# Patient Record
Sex: FEMALE | Race: WHITE | Employment: PART TIME | URBAN - METROPOLITAN AREA
[De-identification: names, ages, dates, MRNs, and addresses within clinical notes are randomized per-mention and may not be internally consistent; named-entity substitution may affect disease eponyms.]

---

## 2020-04-01 ENCOUNTER — TELEPHONE (OUTPATIENT)
Dept: DERMATOLOGY | Facility: CLINIC | Age: 30
End: 2020-04-01

## 2020-04-06 NOTE — TELEPHONE ENCOUNTER
Called pt, no answer. Was attempting to reschedule tomorrow appt with Dr. Deleon.     Becky Schreiber LPN

## 2020-05-18 ENCOUNTER — TELEPHONE (OUTPATIENT)
Dept: DERMATOLOGY | Facility: CLINIC | Age: 30
End: 2020-05-18

## 2020-05-18 ENCOUNTER — MYC MEDICAL ADVICE (OUTPATIENT)
Dept: DERMATOLOGY | Facility: CLINIC | Age: 30
End: 2020-05-18

## 2020-05-18 ENCOUNTER — VIRTUAL VISIT (OUTPATIENT)
Dept: DERMATOLOGY | Facility: CLINIC | Age: 30
End: 2020-05-18
Payer: COMMERCIAL

## 2020-05-18 DIAGNOSIS — Z87.898 HISTORY OF ATYPICAL NEVUS: ICD-10-CM

## 2020-05-18 DIAGNOSIS — L70.0 ACNE VULGARIS: Primary | ICD-10-CM

## 2020-05-18 PROCEDURE — 99202 OFFICE O/P NEW SF 15 MIN: CPT | Mod: 95 | Performed by: DERMATOLOGY

## 2020-05-18 RX ORDER — TRETINOIN 0.5 MG/G
CREAM TOPICAL AT BEDTIME
COMMUNITY
End: 2020-05-18

## 2020-05-18 RX ORDER — CLINDAMYCIN PHOSPHATE 10 UG/ML
LOTION TOPICAL 2 TIMES DAILY
Qty: 60 ML | Refills: 11 | Status: SHIPPED | OUTPATIENT
Start: 2020-05-18 | End: 2021-06-08

## 2020-05-18 RX ORDER — SPIRONOLACTONE 100 MG/1
100 TABLET, FILM COATED ORAL DAILY
Qty: 30 TABLET | Refills: 11 | Status: SHIPPED | OUTPATIENT
Start: 2020-05-18 | End: 2020-12-14

## 2020-05-18 RX ORDER — BUPROPION HYDROCHLORIDE 300 MG/1
TABLET ORAL
COMMUNITY
Start: 2020-05-07

## 2020-05-18 RX ORDER — SPIRONOLACTONE 100 MG/1
100 TABLET, FILM COATED ORAL DAILY
COMMUNITY
End: 2020-05-18

## 2020-05-18 RX ORDER — TRETINOIN 0.5 MG/G
CREAM TOPICAL AT BEDTIME
Qty: 45 G | Refills: 11 | Status: SHIPPED | OUTPATIENT
Start: 2020-05-18 | End: 2021-06-08

## 2020-05-18 RX ORDER — CLINDAMYCIN PHOSPHATE 10 UG/ML
LOTION TOPICAL 2 TIMES DAILY
COMMUNITY
End: 2020-05-18

## 2020-05-18 ASSESSMENT — PAIN SCALES - GENERAL: PAINLEVEL: NO PAIN (0)

## 2020-05-18 NOTE — PROGRESS NOTES
[facility]  Store and Forward and Video ( Invitation sent by:  Patricia and send to e-mail at: [email or web address] )    Impression and Recommendations (Patient Counseled on the Following):    1. Acne vulgaris, inflammatory / hormonal acne, well controlled on topical and systemic regimen as below. Recommended additional of BP wash to prevent clindamycin resistance.     - Continue spironolactone 100 mg PO qdaily and OCP. Discussed theoretic risk of teratogenicity and potential side effects. Has had recent normal creatinine / K+.   - Continue tretinoin 0.05% cream at bedtime and clindamycin 1% lotion qAM  - Start over-the-counter benzoyl peroxide 4-5% wash at least twice weekly  - Follow-up in 3 months    2. History of atypical nevi. Follow-up in 3 months for FBSE.     Follow-up:   Follow-up with dermatology in approximately 3 months. Earlier for new or changing lesions or rash.      Staff only:    Chava Saunders MD  Pronouns: he/him/his    Department of Dermatology  [facility]: Phone: [phone], Fax:[phone]  [facility]: Phone: [phone] Fax: [phone]    _____________________________________________________________________________    Dermatology Problem List:  1. Acne vulgaris, mild to moderate inflammatory acne, likely hormonal.  - spironolactone 100 mg PO qdaily  - tretinoin 0.05% cream at bedtime; clindamycin 1% lotion qAM, BP 4-5% wash  2. Hx atypical nevi.     Encounter Date: May 18, 2020    CC:   Chief Complaint   Patient presents with     Acne     face, back, butt and chest- face is more hormonally- using spirnolactone 100mg, clindamycin lotion, Tretinoin for at least a year with improvement        History of Present Illness:  I have reviewed the teledermatology information and the nursing intake corresponding to this issue. Vibha Paulino is a 30  year old female who presents via teledermatology for acne vulgaris.    She recently moved from [place] to [place] and is trying to reestablish care. Reports a many year history of acne on the face, chest, and back that worsens around her menstrual cycle. She has been well controlled on a regimen of spironolactone 100 mg PO qdaily, oral contraceptive, tretinoin 0.05% cream and clindamycin 1% lotion. She reports tolerating the spironolactone well. No vaginal spotting, breast tenderness, dizziness, light-headedness. She is on OCP and has no plans to get pregnant.     Patient also reports a history of atypical nevi and would like to make an appointment for this summer for a FBSE. No personal history of skin cancer. No lesions of concern today.     Health otherwise stable. No other skin concerns.     ROS: Patient is generally feeling well today.     Physical Examination:  General: Well-appearing male, appropriately-developed individual.  Skin: Focused examination within the teledermatology photograph(s) including face, neck, chest, upper extremities was performed.   - Few scattered brown macules and papules on face and chest; no acne lesions appreciated.  - No other lesions of concern on areas examined.     Labs:  Normal creatinine and K+ on 9/25/19    Past Medical History:   There is no problem list on file for this patient.    No past medical history on file.  No past surgical history on file.    Social History:  Patient reports that she has quit smoking. She has never used smokeless tobacco.    Family History:  No family history on file.    Medications:  Current Outpatient Medications   Medication     buPROPion (WELLBUTRIN XL) 300 MG 24 hr tablet     clindamycin (CLEOCIN T) 1 % external lotion     Norgestimate-Eth Estradiol (MONO-LINYAH PO)     spironolactone (ALDACTONE) 100 MG tablet     tretinoin (RETIN-A) 0.05 % external cream     No current facility-administered medications for this visit.        No Known  Allergies  _____________________________________________________________________________    Teledermatology information:  - Location of patient: Home  - Patient presented as: self referral  - Location of teledermatologist:  [facility] )  - Reason teledermatology is appropriate:  of National Emergency Regarding Coronavirus disease (COVID 19) Outbreak  - Image quality and interpretability: acceptable  - Physician has received verbal consent for a Video/Photos Visit from the patient? Yes  - In-person dermatology visit recommendation: no  - Date of images: 5/18/2020  - Service start time: 9:30 AM  - Service end time: 9:45 AM  - Date of report: 5/18/2020

## 2020-05-18 NOTE — PATIENT INSTRUCTIONS
[facility] Teledermatology Visit    Thank you for allowing us to participate in your care. Your findings, instructions and follow-up plan are as follows:    Acne vulgaris    1. Continue clindamycin 1% lotion in the morning, tretinoin 0.05% cream at nighttime.   2. Continue spironolactone 100 mg daily.   3. Start using an over-the-counter benzoyl peroxide 4-5% wash in the shower at least twice weekly.   4. Follow-up in ~3 months for a full body skin examination.       When should I call my doctor?    If you are worsening or not improving, please, contact us or seek urgent care as noted below.     Who should I call with questions (adults)?    [facility] (adult and pediatric): [phone]     [facility] (adult): [phone]    For urgent needs outside of business hours call the [facility] at [phone] and ask for the dermatology resident on call    If this is a medical emergency and you are unable to reach an ER, Call [phone]      Who should I call with questions (pediatric)?  [facility]- Pediatric Dermatology  Dr. Juanita Ramirez, Dr. Renetta Hernandez, Dr. Kaley Osborn, Orin Guidry, JASMIN Rayo, Dr. Danica King & Dr. Mekhi Buckley  Non Urgent  Nurse Triage Line; [phone]- Imelda and Ksenia PICKERING Care Coordinators   Sandra (/Complex ) [phone]    If you need a prescription refill, please contact your pharmacy. Refills are approved or denied by our Physicians during normal business hours, Monday through Fridays  Per office policy, refills will not be granted if you have not been seen within the past year (or sooner depending on your child's condition)    Scheduling Information:  Pediatric Appointment Scheduling and Call Center [phone]  Radiology Scheduling- [phone]  Sedation Unit Scheduling- [phone]  [place] Scheduling- General  [phone]; Pediatric Dermatology [phone]  Main  Services: [phone]  Welsh: [phone]  Afghan: [phone]  Hmong/Divehi/Sinhala: [phone]  Preadmission Nursing Department Fax Number: [phone] (Fax all pre-operative paperwork to this number)    For urgent matters arising during evenings, weekends, or holidays that cannot wait for normal business hours please call [phone] and ask for the Dermatology Resident On-Call to be paged.

## 2020-05-18 NOTE — TELEPHONE ENCOUNTER
Follow-up:   Follow-up with dermatology in approximately 3 months for skin check and acne     Left message for patient to call [facility] in [place] back at [phone] to schedule follow up with Dr. Nicky Neri LPN

## 2020-05-18 NOTE — NURSING NOTE
"Teledermatology Nurse Call for NEW Patients (not seen in last 3 years):     The patient was contacted by phone and we reviewed, \"Due to the coronavirus pandemic, we are calling to reschedule your upcoming visit and offer you a teledermatology visit. This would be assessed by an MD or PAHOLLY;  Importantly, \"a teledermatology visit may not be as thorough as an in-person visit, and the quality of the photograph and/or video sent may not be of the same quality as that taken by the dermatology clinic.\" After discussing the risks, benefits, and alternatives, the patient would like to proceed with teledermatology because of National Emergency Regarding Coronavirus disease (COVID 19) Outbreak.\"    This video visit will be conducted via a call between you and your physician/provider via [employer]. We have found that certain health care needs can be provided without the need for an in-person physical exam.  This service lets us provide the care you need with a video conversation.  If a prescription is necessary we can send it directly to your pharmacy.  If lab work is needed we can place an order for that and you can then stop by our lab to have the test done at a later time.If during the course of the call the physician/provider feels a video visit is not appropriate, you will not be charged for this service.    Patient would like the video invitation sent by: Send to e-mail at: [email or web address]     The patient will also send photographs via [employer] for review.       The patient verified the location of the photo/video to be their home address.      For photos, patient told nursing these will be uploaded prior to visit     The patient was instructed to take photos of all areas of concern and all areas of any rash.       The patient denied skin pain, fever, mucosal symptoms(lesions, blisters, sores in the mouth, nose, eyes, or genitals)  IF PATIENT ENDORSES ANY OF THESE STOP AND PAGE ON CALL ATTENDING    Additional " notes:  Patient summary of issue:acne  Location of problem on body: chest, butt, back and face  How long has area/symptoms been present:at least a year previously seen by Dermatology in [place]  What makes it better?:currently using Spirolactone 100mg, Tretinoin 0.05% cream and clindamycin lotion  What makes it worse?:hormonal worse on face  Other symptoms include the following:none  Which mediations have been tried, for how long, and did they make it better or worse (ex. Triamcinolone, used twice daily for 2 weeks, not improved): Spironolactone, tretinoin and clindamycin  The patient has seen a dermatologist in the past for skin check and acne- in [place].   The patient hasno past medical history of skin cancer. Family history of skin cancer unsure of type  ROS: The patient is generally feeling well.       Nursing tasks completed  -Pharmacy preference was updated.  -The nurse has dropped in the AVS information *(For adults the phrase is umdermhteleavs and for pediatrics it is their own) for the physician to route in the AVS.                                                                                                                                                                                                                         -The patient was told to contact the clinic if they have not received correspondence within 72 hours.     Merna Neri LPN

## 2020-08-31 ENCOUNTER — VIRTUAL VISIT (OUTPATIENT)
Dept: DERMATOLOGY | Facility: CLINIC | Age: 30
End: 2020-08-31
Payer: COMMERCIAL

## 2020-08-31 DIAGNOSIS — L70.0 ACNE VULGARIS: Primary | ICD-10-CM

## 2020-08-31 DIAGNOSIS — Z87.898 HISTORY OF ATYPICAL NEVUS: ICD-10-CM

## 2020-08-31 PROCEDURE — 99441 ZZC PHYSICIAN TELEPHONE EVALUATION 5-10 MIN: CPT | Mod: 95 | Performed by: DERMATOLOGY

## 2020-08-31 NOTE — PATIENT INSTRUCTIONS
[facility] Dermatology Visit    Thank you for allowing us to participate in your care. Your findings, instructions and follow-up plan are as follows:    Acne vulgaris.   1. Continue all therapies as prior prescribed.     History of atypical moles.   1. Will arrange for in-person visit in the next few months.     When should I call my doctor?    If you are worsening or not improving, please, contact us or seek urgent care as noted below.     Who should I call with questions (adults)?    [facility] (adult and pediatric): [phone]     [facility] (adult): [phone]    For urgent needs outside of business hours call the [facility] at [phone] and ask for the dermatology resident on call    If this is a medical emergency and you are unable to reach an ER, Call [phone]      Who should I call with questions (pediatric)?  [facility]- Pediatric Dermatology  Dr. Juanita Ramirez, Dr. Renetta Hernandez, Dr. Kaley Osborn, Orin Guidry, JASMIN Rayo, Dr. Danica King & Dr. Mekhi Buckley  Non Urgent  Nurse Triage Line; [phone]- Imelda and Ksenia RN Care Coordinators   Sandra (/Complex ) [phone]    If you need a prescription refill, please contact your pharmacy. Refills are approved or denied by our Physicians during normal business hours, Monday through Fridays  Per office policy, refills will not be granted if you have not been seen within the past year (or sooner depending on your child's condition)    Scheduling Information:  Pediatric Appointment Scheduling and Call Center [phone]  Radiology Scheduling- [phone]  Sedation Unit Scheduling- [phone]  [place] Scheduling- General [phone]; Pediatric Dermatology [phone]  Main  Services: [phone]  Azeri: [phone]  Dutch: [phone]  Hmong/Ketan/Dave:  [phone]  Preadmission Nursing Department Fax Number: [phone] (Fax all pre-operative paperwork to this number)    For urgent matters arising during evenings, weekends, or holidays that cannot wait for normal business hours please call [phone] and ask for the Dermatology Resident On-Call to be paged.

## 2020-08-31 NOTE — LETTER
"    8/31/2020         RE: Vibha Paulino  [address] [facility] [place] [address]        Dear Colleague,    Thank you for referring your patient, Vibha Paulino, to the [facility]. Please see a copy of my visit note below.    Teledermatology Nurse Call for RETURN patients seen within the last 3 years:      The patient was contacted by phone and we reviewed they have a visit in teledermatology upcoming with an MD or PAEdC;  Importantly, \"a teledermatology visit may not be as thorough as an in-person visit, and the quality of the photograph and/or video sent may not be of the same quality as that taken by the dermatology clinic.\"     We have found that certain health care needs can be provided without the need for an in-person physical exam.   If a prescription is necessary we can send it directly to your pharmacy.  If lab work is needed we can place an order for that and you can then stop by our lab to have the test done at a later time.Visits are billed at different rates depending on your insurance coverage. Please reach out to your insurance provider with any questions.    The patient chose to:                                                                                                                                                                                                                 Consent to a teledermatology visit with [employer] photos. Patient told by nursing these are already uploaded. Instructions sent to patient via [employer] They verified they will be in the state of [place] at the time of the encounter.                                                                                                                                                                                                                                The patient denied skin pain, fever, mucosal symptoms(lesions, blisters, sores in the mouth, nose, eyes, or genitals) no IF PATIENT ENDORSES ANY OF THESE " STOP AND PAGE ON CALL ATTENDING    1. Acne vulgaris, using spironolactone 100 mg PO qdaily and OCP,over-the-counter wash dermatolgica, clindamycin lotion, using retin a once a week     Rajani Tavera LPN    [facility] Dermatology Record:  Store and Forward and Telephone [phone]      Dermatology Problem List:  1. Acne vulgaris, mild to moderate inflammatory acne, likely hormonal.  - spironolactone 100 mg PO qdaily  - tretinoin 0.05% cream at bedtime; clindamycin 1% lotion qAM, BP 4-5% wash  2. Hx atypical nevi.     Encounter Date: Aug 31, 2020    CC:   Chief Complaint   Patient presents with     Derm Problem     Acne       History of Present Illness:  Vibha Paulino is a 30 year old female who presents as follow-up for acne vulgaris. Last seen 3 months ago when she was continued on tretinoin 0.05% cream at bedtime, clindamycin 1% lotion, and spironolactone 100 mg PO qdaily with plan to start benzoyl peroxide 4-5% wash at least once or twice weekly to prevent bacterial resistance. She also had an history of atypical nevi and recommended eventual in-person visit for FBSE.     Today, patient reports that her acne is overall well controlled. She does get occasionally new spots, especially on the back related to her sweating/running. She is using spironolactone 100 mg PO qdaily, clindamycin 1% lotion daily, benzoyl peroxide wash a few times a week in the shower. She uses the tretinoin 0.05% cream only about once a week in the summer as it is too dry and photosensitizing. She reports no significant side effects from spironolactone, including spotting, breast tenderness, headaches, dizziness, changes in urination. Patient is on OCP and has no plans to become pregnant. She would like to schedule an in-person visit for FBSE given her history of atypical nevi. Health otherwise stable. No other skin concerns. .     ROS: Patient is generally feeling well today.     Physical Examination:  General: Well-appearing female,  appropriately-developed individual.  Skin: Focused examination including face and neck was performed.   - No significant acneiform lesions appreciated on exam of head and neck.             Labs:  None.     Past Medical History:   There is no problem list on file for this patient.    No past medical history on file.  No past surgical history on file.    Social History:  Patient reports that she has quit smoking. She has never used smokeless tobacco.    Family History:  No family history on file.    Medications:  Current Outpatient Medications   Medication     buPROPion (WELLBUTRIN XL) 300 MG 24 hr tablet     clindamycin (CLEOCIN T) 1 % external lotion     Norgestimate-Eth Estradiol (MONO-LINYAH PO)     spironolactone (ALDACTONE) 100 MG tablet     tretinoin (RETIN-A) 0.05 % external cream     No current facility-administered medications for this visit.         No Known Allergies    Impression and Recommendations (Patient Counseled on the Following):    1. Acne vulgaris, well controlled on current regimen.   - Continue spironolactone 100 mg PO qdaily  - Continue clindamycin 1% lotion daily and BP 4-5% wash in the shower  - Continue tretinoin 0.05% cream nightly as able    2. History of atypical nevi. Will arrange in-person visit in about 3 months for FBSE.     Follow-up:   Follow-up with dermatology in approximately 3 months. Earlier for new or changing lesions or rash.      Staff only    Chava Saunders MD  Pronouns: he/him/his    Department of Dermatology  [facility] [place]note 2]: Phone: [phone], Fax:[phone]  [facility]: Phone: [phone] Fax: [phone]  _____________________________________________________________________________    Teledermatology information:  - Location of patient: [place]  - Patient presented as: return  - Location of teledermatologist:  [facility]  )  - Reason teledermatology is appropriate:  of National Emergency Regarding Coronavirus disease (COVID 19) Outbreak  - Image quality and interpretability: acceptable  - Physician has received verbal consent for a Video/Photos Visit from the patient? Yes  - In-person dermatology visit recommendation: no  - Date of images: 8/30/2020  - Service start time: 8:15 AM  - Service end time: 8:24 AM  - Date of report: 8/31/2020                                                                                                                                                                                                              Again, thank you for allowing me to participate in the care of your patient.        Sincerely,        Chava Saunders MD

## 2020-08-31 NOTE — PROGRESS NOTES
"Teledermatology Nurse Call for RETURN patients seen within the last 3 years:      The patient was contacted by phone and we reviewed they have a visit in teledermatology upcoming with an MD or GARRETT;  Importantly, \"a teledermatology visit may not be as thorough as an in-person visit, and the quality of the photograph and/or video sent may not be of the same quality as that taken by the dermatology clinic.\"     We have found that certain health care needs can be provided without the need for an in-person physical exam.   If a prescription is necessary we can send it directly to your pharmacy.  If lab work is needed we can place an order for that and you can then stop by our lab to have the test done at a later time.Visits are billed at different rates depending on your insurance coverage. Please reach out to your insurance provider with any questions.    The patient chose to:                                                                                                                                                                                                                 Consent to a teledermatology visit with [employer] photos. Patient told by nursing these are already uploaded. Instructions sent to patient via [employer]. They verified they will be in the state of [place] at the time of the encounter.                                                                                                                                                                                                                                The patient denied skin pain, fever, mucosal symptoms(lesions, blisters, sores in the mouth, nose, eyes, or genitals) no IF PATIENT ENDORSES ANY OF THESE STOP AND PAGE ON CALL ATTENDING    1. Acne vulgaris, using spironolactone 100 mg PO qdaily and OCP,over-the-counter wash dermatolgica, clindamycin lotion, using retin a once a week     Rajani Tavera LPN    [facility] Dermatology Record:  Store and " Forward and Telephone [phone]      Dermatology Problem List:  1. Acne vulgaris, mild to moderate inflammatory acne, likely hormonal.  - spironolactone 100 mg PO qdaily  - tretinoin 0.05% cream at bedtime; clindamycin 1% lotion qAM, BP 4-5% wash  2. Hx atypical nevi.     Encounter Date: Aug 31, 2020    CC:   Chief Complaint   Patient presents with     Derm Problem     Acne       History of Present Illness:  Vibha Paulino is a 30 year old female who presents as follow-up for acne vulgaris. Last seen 3 months ago when she was continued on tretinoin 0.05% cream at bedtime, clindamycin 1% lotion, and spironolactone 100 mg PO qdaily with plan to start benzoyl peroxide 4-5% wash at least once or twice weekly to prevent bacterial resistance. She also had an history of atypical nevi and recommended eventual in-person visit for FBSE.     Today, patient reports that her acne is overall well controlled. She does get occasionally new spots, especially on the back related to her sweating/running. She is using spironolactone 100 mg PO qdaily, clindamycin 1% lotion daily, benzoyl peroxide wash a few times a week in the shower. She uses the tretinoin 0.05% cream only about once a week in the summer as it is too dry and photosensitizing. She reports no significant side effects from spironolactone, including spotting, breast tenderness, headaches, dizziness, changes in urination. Patient is on OCP and has no plans to become pregnant. She would like to schedule an in-person visit for FBSE given her history of atypical nevi. Health otherwise stable. No other skin concerns. .     ROS: Patient is generally feeling well today.     Physical Examination:  General: Well-appearing female, appropriately-developed individual.  Skin: Focused examination including face and neck was performed.   - No significant acneiform lesions appreciated on exam of head and neck.             Labs:  None.     Past Medical History:   There is no problem  list on file for this patient.    No past medical history on file.  No past surgical history on file.    Social History:  Patient reports that she has quit smoking. She has never used smokeless tobacco.    Family History:  No family history on file.    Medications:  Current Outpatient Medications   Medication     buPROPion (WELLBUTRIN XL) 300 MG 24 hr tablet     clindamycin (CLEOCIN T) 1 % external lotion     Norgestimate-Eth Estradiol (MONO-LINYAH PO)     spironolactone (ALDACTONE) 100 MG tablet     tretinoin (RETIN-A) 0.05 % external cream     No current facility-administered medications for this visit.         No Known Allergies    Impression and Recommendations (Patient Counseled on the Following):    1. Acne vulgaris, well controlled on current regimen.   - Continue spironolactone 100 mg PO qdaily  - Continue clindamycin 1% lotion daily and BP 4-5% wash in the shower  - Continue tretinoin 0.05% cream nightly as able    2. History of atypical nevi. Will arrange in-person visit in about 3 months for FBSE.     Follow-up:   Follow-up with dermatology in approximately 3 months. Earlier for new or changing lesions or rash.      Staff only    Chava Saunders MD  Pronouns: he/him/his    Department of Dermatology  [facility]: Phone: [phone], Fax:[phone]  [facility]: Phone: [phone] Fax: [phone]  _____________________________________________________________________________    Teledermatology information:  - Location of patient: [place]  - Patient presented as: return  - Location of teledermatologist:  ([facility] )  - Reason teledermatology is appropriate:  of National Emergency Regarding Coronavirus disease (COVID 19) Outbreak  - Image quality and interpretability: acceptable  - Physician has received verbal consent for a Video/Photos Visit from the patient?  Yes  - In-person dermatology visit recommendation: no  - Date of images: 8/30/2020  - Service start time: 8:15 AM  - Service end time: 8:24 AM  - Date of report: 8/31/2020

## 2020-12-14 ENCOUNTER — OFFICE VISIT (OUTPATIENT)
Dept: DERMATOLOGY | Facility: CLINIC | Age: 30
End: 2020-12-14
Payer: COMMERCIAL

## 2020-12-14 DIAGNOSIS — L81.4 SOLAR LENTIGO: Primary | ICD-10-CM

## 2020-12-14 DIAGNOSIS — D22.9 MULTIPLE BENIGN NEVI: ICD-10-CM

## 2020-12-14 DIAGNOSIS — Z87.898 HISTORY OF ATYPICAL NEVUS: ICD-10-CM

## 2020-12-14 DIAGNOSIS — L70.0 ACNE VULGARIS: ICD-10-CM

## 2020-12-14 PROCEDURE — 99214 OFFICE O/P EST MOD 30 MIN: CPT | Performed by: DERMATOLOGY

## 2020-12-14 RX ORDER — SPIRONOLACTONE 100 MG/1
100 TABLET, FILM COATED ORAL DAILY
Qty: 30 TABLET | Refills: 11 | Status: SHIPPED | OUTPATIENT
Start: 2020-12-14 | End: 2021-12-16

## 2020-12-14 RX ORDER — GABAPENTIN 100 MG/1
CAPSULE ORAL
COMMUNITY
Start: 2020-11-18 | End: 2021-04-13

## 2020-12-14 ASSESSMENT — PAIN SCALES - GENERAL: PAINLEVEL: NO PAIN (0)

## 2020-12-14 NOTE — PROGRESS NOTES
[facility] Dermatology Note    Dermatology Problem List:  1. Acne vulgaris, mild to moderate inflammatory acne, likely hormonal.  - spironolactone 100 mg PO qdaily  - tretinoin 0.05% cream at bedtime; clindamycin 1% lotion qAM, BP 4-5% wash  2. Hx atypical nevi.   -reported on left abdomen and mid back  3. Family history of NMSC  -Brother and mother    Encounter Date: Dec 14, 2020    CC:  Chief Complaint   Patient presents with     Skin Check     FBSE, no personal hx NMSC, mom and brother NMSC, no areas of concern       History of Present Illness:  Ms. Vibha Paulino is a 30 year old female with a history of atypical nevi who presents for a skin check. She was last seen on 8/31/2020 for acne vulgaris, when she reported she was doing well and was continued on her current treatment. Today, she reports her acne is doing pretty well, but she does note a recent flare up during a stressful time. She denies any side effects with spironolactone and is on OCP. She has no areas of concern.    The patient otherwise denies any new or concerning lesions. No bleeding, painful, pruritic, or changing lesions. They report no personal history of skin cancer. There is  family history of skin cancer. No history of immunosuppression.They have been using sunscreen recently, and protective clothing when outdoors for sun protection. No occupational exposure to ultraviolet light or other forms of radiation. Patient is a . Health otherwise stable. No other skin concerns.     Past Medical History:   There is no problem list on file for this patient.    No past medical history on file.  No past surgical history on file.    Social History:  Patient reports that she has quit smoking. She has never used smokeless tobacco.    Family History:  No family history on file.    Medications:  Current Outpatient Medications   Medication Sig Dispense Refill     buPROPion (WELLBUTRIN XL) 300 MG 24 hr tablet TK 1 T PO D UTD        clindamycin (CLEOCIN T) 1 % external lotion Apply topically 2 times daily 60 mL 11     gabapentin (NEURONTIN) 100 MG capsule TK 1 C PO TID UTD       Norgestimate-Eth Estradiol (MONO-LINYAH PO)        spironolactone (ALDACTONE) 100 MG tablet Take 1 tablet (100 mg) by mouth daily 30 tablet 11     tretinoin (RETIN-A) 0.05 % external cream Apply topically At Bedtime 45 g 11       No Known Allergies    Review of Systems:  -As per HPI  -Constitutional: Otherwise feeling well today, in usual state of health.  -Skin: As above in HPI. No additional skin concerns.    Physical exam:  Vitals: There were no vitals taken for this visit.  GEN: This is a well developed, well-nourished female in no acute distress, in a pleasant mood.    SKIN: Total skin excluding the undergarment areas was performed. The exam included the head/face, neck, both arms, chest, back, abdomen, both legs, digits and/or nails.   - Bethea skin type: II  - There is a well healed hypopigmented scar without erythema, nodularity or telangiectasias, or repigmentation on the left upper abdomen and paraspinal back at prior atypical nevi biopsy sites with no repigmentation.   - Few regular brown pigmented macules and papules are identified on the sun exposed areas.   - Scattered brown macules on sun exposed areas.  - No other lesions of concern on areas examined.     Labs:  None.    Impression/Plan:    1. Acne vulgaris. Stable.   - Continue spironolactone 100 mg PO qdaily; 1=year refill provided  - Continue tretinoin 0.05% cream at bedtime; clindamycin 1% qAM, BP 4-5% wash     2. Benign lesions: Multiple benign nevi, solar lentigos. Explained to patient benign nature of lesion. No treatment is necessary at this time unless the lesion changes or becomes symptomatic.   - ABCDs of melanoma were discussed and self skin checks were advised.  - Sun precaution was advised including the use of sun screens of SPF 30 or higher, sun protective clothing, and avoidance  of tanning beds.     3. Hx atypical. NERD.   - ABCDs of melanoma were discussed and self skin checks were advised.  - Sun precaution was advised including the use of sun screens of SPF 30 or higher, sun protective clothing, and avoidance of tanning beds.     Follow-up in 1 year, earlier for new or changing lesions.     Staff Involved:  Scribe/Staff    Scribe Disclosure:   I, Felipe Travis, am serving as a scribe to document services personally performed by this physician, Dr. Chava Saunders, based on data collection and the provider's statements to me.     Provider Disclosure:   The documentation recorded by the scribe accurately reflects the services I personally performed and the decisions made by me.    Chava Saunders MD    Department of Dermatology  [facility]: Phone: [phone], Fax:[phone]  [facility]: Phone: [phone] Fax: [phone]

## 2020-12-14 NOTE — NURSING NOTE
Vibha Paulino's goals for this visit include:   Chief Complaint   Patient presents with     Skin Check     FBSE, no personal hx NMSC, mom and brother NMSC, no areas of concern     She requests these members of her care team be copied on today's visit information: No    PCP: No Ref-Primary, Physician    Referring Provider:  No referring provider defined for this encounter.    There were no vitals taken for this visit.    Do you need any medication refills at today's visit? No    Zoraida Rebollar LPN

## 2020-12-14 NOTE — PATIENT INSTRUCTIONS
Discussed Heliocare / Heliocare Advanced as oral supplement to prevent sunburn when outdoors.

## 2020-12-14 NOTE — LETTER
12/14/2020         RE: Vibha Paulino  [address] [facility] [address]        Dear Colleague,    Thank you for referring your patient, Vibha Paulino, to the [facility]. Please see a copy of my visit note below.    [facility] Dermatology Note    Dermatology Problem List:  1. Acne vulgaris, mild to moderate inflammatory acne, likely hormonal.  - spironolactone 100 mg PO qdaily  - tretinoin 0.05% cream at bedtime; clindamycin 1% lotion qAM, BP 4-5% wash  2. Hx atypical nevi.   -reported on left abdomen and mid back  3. Family history of NMSC  -Brother and mother    Encounter Date: Dec 14, 2020    CC:  Chief Complaint   Patient presents with     Skin Check     FBSE, no personal hx NMSC, mom and brother NMSC, no areas of concern       History of Present Illness:  Ms. Vibha Paulino is a 30 year old female with a history of atypical nevi who presents for a skin check. She was last seen on 8/31/2020 for acne vulgaris, when she reported she was doing well and was continued on her current treatment. Today, she reports her acne is doing pretty well, but she does note a recent flare up during a stressful time. She denies any side effects with spironolactone and is on OCP. She has no areas of concern.    The patient otherwise denies any new or concerning lesions. No bleeding, painful, pruritic, or changing lesions. They report no personal history of skin cancer. There is  family history of skin cancer. No history of immunosuppression.They have been using sunscreen recently, and protective clothing when outdoors for sun protection. No occupational exposure to ultraviolet light or other forms of radiation. Patient is a . Health otherwise stable. No other skin concerns.     Past Medical History:   There is no problem list on file for this patient.    No past medical history on file.  No past surgical history on file.    Social History:  Patient reports  that she has quit smoking. She has never used smokeless tobacco.    Family History:  No family history on file.    Medications:  Current Outpatient Medications   Medication Sig Dispense Refill     buPROPion (WELLBUTRIN XL) 300 MG 24 hr tablet TK 1 T PO D UTD       clindamycin (CLEOCIN T) 1 % external lotion Apply topically 2 times daily 60 mL 11     gabapentin (NEURONTIN) 100 MG capsule TK 1 C PO TID UTD       Norgestimate-Eth Estradiol (MONO-LINYAH PO)        spironolactone (ALDACTONE) 100 MG tablet Take 1 tablet (100 mg) by mouth daily 30 tablet 11     tretinoin (RETIN-A) 0.05 % external cream Apply topically At Bedtime 45 g 11       No Known Allergies    Review of Systems:  -As per HPI  -Constitutional: Otherwise feeling well today, in usual state of health.  -Skin: As above in HPI. No additional skin concerns.    Physical exam:  Vitals: There were no vitals taken for this visit.  GEN: This is a well developed, well-nourished female in no acute distress, in a pleasant mood.    SKIN: Total skin excluding the undergarment areas was performed. The exam included the head/face, neck, both arms, chest, back, abdomen, both legs, digits and/or nails.   - Bethea skin type: II  - There is a well healed hypopigmented scar without erythema, nodularity or telangiectasias, or repigmentation on the left upper abdomen and paraspinal back at prior atypical nevi biopsy sites with no repigmentation.   - Few regular brown pigmented macules and papules are identified on the sun exposed areas.   - Scattered brown macules on sun exposed areas.  - No other lesions of concern on areas examined.     Labs:  None.    Impression/Plan:    1. Acne vulgaris. Stable.   - Continue spironolactone 100 mg PO qdaily; 1=year refill provided  - Continue tretinoin 0.05% cream at bedtime; clindamycin 1% qAM, BP 4-5% wash     2. Benign lesions: Multiple benign nevi, solar lentigos. Explained to patient benign nature of lesion. No treatment is  necessary at this time unless the lesion changes or becomes symptomatic.   - ABCDs of melanoma were discussed and self skin checks were advised.  - Sun precaution was advised including the use of sun screens of SPF 30 or higher, sun protective clothing, and avoidance of tanning beds.     3. Hx atypical. NERD.   - ABCDs of melanoma were discussed and self skin checks were advised.  - Sun precaution was advised including the use of sun screens of SPF 30 or higher, sun protective clothing, and avoidance of tanning beds.     Follow-up in 1 year, earlier for new or changing lesions.     Staff Involved:  Scribe/Staff    Scribe Disclosure:   I, Felipe Travis, am serving as a scribe to document services personally performed by this physician, Dr. Chava Saunders, based on data collection and the provider's statements to me.     Provider Disclosure:   The documentation recorded by the scribe accurately reflects the services I personally performed and the decisions made by me.    Chava Saunders MD    Department of Dermatology  [facility]: Phone: [phone], Fax:[phone]  [facility]: Phone: [phone] Fax: [phone]            Again, thank you for allowing me to participate in the care of your patient.        Sincerely,        Chava Saunders MD

## 2020-12-15 ENCOUNTER — OFFICE VISIT (OUTPATIENT)
Dept: PEDIATRICS | Facility: CLINIC | Age: 30
End: 2020-12-15
Payer: COMMERCIAL

## 2020-12-15 VITALS
WEIGHT: 134.7 LBS | TEMPERATURE: 98.2 F | SYSTOLIC BLOOD PRESSURE: 121 MMHG | HEART RATE: 112 BPM | BODY MASS INDEX: 21.14 KG/M2 | OXYGEN SATURATION: 100 % | DIASTOLIC BLOOD PRESSURE: 92 MMHG | HEIGHT: 67 IN

## 2020-12-15 DIAGNOSIS — Z11.59 ENCOUNTER FOR HEPATITIS C SCREENING TEST FOR LOW RISK PATIENT: ICD-10-CM

## 2020-12-15 DIAGNOSIS — Z13.228 SCREENING FOR ENDOCRINE, NUTRITIONAL, METABOLIC AND IMMUNITY DISORDER: ICD-10-CM

## 2020-12-15 DIAGNOSIS — Z13.0 SCREENING FOR ENDOCRINE, NUTRITIONAL, METABOLIC AND IMMUNITY DISORDER: ICD-10-CM

## 2020-12-15 DIAGNOSIS — Z13.6 CARDIOVASCULAR SCREENING; LDL GOAL LESS THAN 100: ICD-10-CM

## 2020-12-15 DIAGNOSIS — Z12.4 SCREENING FOR CERVICAL CANCER: ICD-10-CM

## 2020-12-15 DIAGNOSIS — N91.2 ABSENCE OF MENSTRUATION: ICD-10-CM

## 2020-12-15 DIAGNOSIS — Z11.4 ENCOUNTER FOR SCREENING FOR HIV: ICD-10-CM

## 2020-12-15 DIAGNOSIS — Z13.21 SCREENING FOR ENDOCRINE, NUTRITIONAL, METABOLIC AND IMMUNITY DISORDER: ICD-10-CM

## 2020-12-15 DIAGNOSIS — Z13.21 ENCOUNTER FOR VITAMIN DEFICIENCY SCREENING: ICD-10-CM

## 2020-12-15 DIAGNOSIS — Z13.29 SCREENING FOR ENDOCRINE, NUTRITIONAL, METABOLIC AND IMMUNITY DISORDER: ICD-10-CM

## 2020-12-15 DIAGNOSIS — Z00.00 ROUTINE GENERAL MEDICAL EXAMINATION AT A HEALTH CARE FACILITY: Primary | ICD-10-CM

## 2020-12-15 PROBLEM — T50.901A POLYSUBSTANCE OVERDOSE: Status: ACTIVE | Noted: 2019-09-12

## 2020-12-15 PROBLEM — Z86.59 HISTORY OF BULIMIA NERVOSA: Status: ACTIVE | Noted: 2020-12-15

## 2020-12-15 PROBLEM — F41.1 GAD (GENERALIZED ANXIETY DISORDER): Status: ACTIVE | Noted: 2020-12-15

## 2020-12-15 PROBLEM — F39 EPISODIC MOOD DISORDER (H): Status: ACTIVE | Noted: 2020-12-15

## 2020-12-15 PROBLEM — F33.9 EPISODE OF RECURRENT MAJOR DEPRESSIVE DISORDER, UNSPECIFIED DEPRESSION EPISODE SEVERITY (H): Status: ACTIVE | Noted: 2020-12-15

## 2020-12-15 PROCEDURE — 99385 PREV VISIT NEW AGE 18-39: CPT | Performed by: FAMILY MEDICINE

## 2020-12-15 PROCEDURE — 87624 HPV HI-RISK TYP POOLED RSLT: CPT | Performed by: FAMILY MEDICINE

## 2020-12-15 PROCEDURE — G0145 SCR C/V CYTO,THINLAYER,RESCR: HCPCS | Performed by: FAMILY MEDICINE

## 2020-12-15 RX ORDER — NORGESTIMATE AND ETHINYL ESTRADIOL 0.25-0.035
1 KIT ORAL DAILY
Qty: 84 TABLET | Refills: 3 | Status: CANCELLED | OUTPATIENT
Start: 2020-12-15

## 2020-12-15 SDOH — HEALTH STABILITY: MENTAL HEALTH: HOW OFTEN DO YOU HAVE A DRINK CONTAINING ALCOHOL?: NEVER

## 2020-12-15 ASSESSMENT — MIFFLIN-ST. JEOR: SCORE: 1358.63

## 2020-12-15 NOTE — NURSING NOTE
"Chief Complaint   Patient presents with     Physical     AFE-discuss birth control       Initial BP (!) 121/92 (BP Location: Right arm, Patient Position: Sitting, Cuff Size: Adult Regular)   Pulse 112   Temp 98.2  F (36.8  C) (Temporal)   Ht 1.702 m (5' 7\")   Wt 61.1 kg (134 lb 11.2 oz)   LMP 11/23/2020   SpO2 100%   Breastfeeding No   BMI 21.10 kg/m   Estimated body mass index is 21.1 kg/m  as calculated from the following:    Height as of this encounter: 1.702 m (5' 7\").    Weight as of this encounter: 61.1 kg (134 lb 11.2 oz).  Medication Reconciliation: complete      JOLANTA Gonzalez      "

## 2020-12-15 NOTE — PATIENT INSTRUCTIONS
Needs fasting labs 12/18/2020  Please place  nexplanon procedure 9:50AM  Preventive Health Recommendations  Female Ages 26 - 39  Yearly exam:   See your health care provider every year in order to    Review health changes.     Discuss preventive care.      Review your medicines if you your doctor has prescribed any.    Until age 30: Get a Pap test every three years (more often if you have had an abnormal result).    After age 30: Talk to your doctor about whether you should have a Pap test every 3 years or have a Pap test with HPV screening every 5 years.   You do not need a Pap test if your uterus was removed (hysterectomy) and you have not had cancer.  You should be tested each year for STDs (sexually transmitted diseases), if you're at risk.   Talk to your provider about how often to have your cholesterol checked.  If you are at risk for diabetes, you should have a diabetes test (fasting glucose).  Shots: Get a flu shot each year. Get a tetanus shot every 10 years.   Nutrition:     Eat at least 5 servings of fruits and vegetables each day.    Eat whole-grain bread, whole-wheat pasta and brown rice instead of white grains and rice.    Get adequate Calcium and Vitamin D.     Lifestyle    Exercise at least 150 minutes a week (30 minutes a day, 5 days of the week). This will help you control your weight and prevent disease.    Limit alcohol to one drink per day.    No smoking.     Wear sunscreen to prevent skin cancer.    See your dentist every six months for an exam and cleaning.

## 2020-12-15 NOTE — PROGRESS NOTES
SUBJECTIVE:   CC: Vibha Paulino is an 30 year old woman who presents for preventive health visit.     Patient has been advised of split billing requirements and indicates understanding: Yes    Healthy Habits:    Do you get at least three servings of calcium containing foods daily (dairy, green leafy vegetables, etc.)? yes    Amount of exercise or daily activities, outside of work: 6-7 day(s) per week    Problems taking medications regularly No    Medication side effects: No    Have you had an eye exam in the past two years? no    Do you see a dentist twice per year? no    Do you have sleep apnea, excessive snoring or daytime drowsiness?no    Discussed contraceptive means available including IUD, nuvaring, combination oral contraceptives, and Nexplanon.   She wishes to proceed with Nexplanon placement for contraception      Today's PHQ-2 Score:   PHQ-2 ( 1999 Pfizer) 12/15/2020 5/18/2020   Q1: Little interest or pleasure in doing things 0 0   Q2: Feeling down, depressed or hopeless 0 0   PHQ-2 Score 0 0       Abuse: Current or Past(Physical, Sexual or Emotional)- No  Do you feel safe in your environment? Yes      Social History     Tobacco Use     Smoking status: Former Smoker     Smokeless tobacco: Never Used   Substance Use Topics     Alcohol use: Never     Frequency: Never     If you drink alcohol do you typically have >3 drinks per day or >7 drinks per week? No                     Reviewed orders with patient.  Reviewed health maintenance and updated orders accordingly - Yes  BP Readings from Last 3 Encounters:   12/15/20 (!) 121/92    Wt Readings from Last 3 Encounters:   12/15/20 61.1 kg (134 lb 11.2 oz)                  Patient Active Problem List   Diagnosis     Episodic mood disorder (H)     Episode of recurrent major depressive disorder, unspecified depression episode severity (H)     RIK (generalized anxiety disorder)     History of bulimia nervosa     Polysubstance overdose     History reviewed. No  pertinent surgical history.    Social History     Tobacco Use     Smoking status: Former Smoker     Smokeless tobacco: Never Used   Substance Use Topics     Alcohol use: Never     Frequency: Never     History reviewed. No pertinent family history.      Current Outpatient Medications   Medication Sig Dispense Refill     buPROPion (WELLBUTRIN XL) 300 MG 24 hr tablet TK 1 T PO D UTD       clindamycin (CLEOCIN T) 1 % external lotion Apply topically 2 times daily 60 mL 11     gabapentin (NEURONTIN) 100 MG capsule TK 1 C PO TID UTD       Norgestimate-Eth Estradiol (MONO-LINYAH PO)        spironolactone (ALDACTONE) 100 MG tablet Take 1 tablet (100 mg) by mouth daily 30 tablet 11     tretinoin (RETIN-A) 0.05 % external cream Apply topically At Bedtime 45 g 11     No Known Allergies  No lab results found.     Mammogram not appropriate for this patient based on age.    Pertinent mammograms are reviewed under the imaging tab.  History of abnormal Pap smear: NO - age 21-29 PAP every 3 years recommended  NO - age 30- 65 PAP every 3 years recommended  NO - age 30-65 PAP every 5 years with negative HPV co-testing recommended     Reviewed and updated as needed this visit by clinical staff  Tobacco  Allergies  Meds   Med Hx  Surg Hx  Fam Hx  Soc Hx        Reviewed and updated as needed this visit by Provider      Med Hx  Surg Hx  Fam Hx         Past Medical History:   Diagnosis Date     Acne vulgaris       History reviewed. No pertinent surgical history.  OB History   No obstetric history on file.       ROS:  CONSTITUTIONAL: NEGATIVE for fever, chills, change in weight  INTEGUMENTARU/SKIN: NEGATIVE for worrisome rashes, moles or lesions  EYES: NEGATIVE for vision changes or irritation  ENT: NEGATIVE for ear, mouth and throat problems  RESP: NEGATIVE for significant cough or SOB  BREAST: NEGATIVE for masses, tenderness or discharge  CV: NEGATIVE for chest pain, palpitations or peripheral edema  GI: NEGATIVE for nausea,  "abdominal pain, heartburn, or change in bowel habits  : NEGATIVE for unusual urinary or vaginal symptoms. Periods are regular.  MUSCULOSKELETAL: NEGATIVE for significant arthralgias or myalgia  NEURO: NEGATIVE for weakness, dizziness or paresthesias  PSYCHIATRIC: NEGATIVE for changes in mood or affect    OBJECTIVE:   BP (!) 121/92 (BP Location: Right arm, Patient Position: Sitting, Cuff Size: Adult Regular)   Pulse 112   Temp 98.2  F (36.8  C) (Temporal)   Ht 1.702 m (5' 7\")   Wt 61.1 kg (134 lb 11.2 oz)   LMP 11/23/2020   SpO2 100%   Breastfeeding No   BMI 21.10 kg/m    EXAM:  GENERAL: healthy, alert and no distress  EYES: Eyes grossly normal to inspection, PERRL and conjunctivae and sclerae normal  HENT: ear canals and TM's normal, nose and mouth without ulcers or lesions  NECK: no adenopathy, no asymmetry, masses, or scars and thyroid normal to palpation  RESP: lungs clear to auscultation - no rales, rhonchi or wheezes  BREAST: normal without masses, tenderness or nipple discharge and no palpable axillary masses or adenopathy  CV: regular rate and rhythm, normal S1 S2, no S3 or S4, no murmur, click or rub, no peripheral edema and peripheral pulses strong  ABDOMEN: soft, nontender, no hepatosplenomegaly, no masses and bowel sounds normal   (female): normal female external genitalia, normal urethral meatus, vaginal mucosa pink, moist, well rugated, and normal cervix/adnexa/uterus without masses or discharge  MS: no gross musculoskeletal defects noted, no edema  SKIN: no suspicious lesions or rashes  NEURO: Normal strength and tone, mentation intact and speech normal  PSYCH: mentation appears normal, affect normal/bright      ASSESSMENT/PLAN:   1. Routine general medical examination at a health care facility  See counseling    2. Screening for cervical cancer  - Pap imaged thin layer screen with HPV - recommended age 30 - 65 years (select HPV order below)  - HPV High Risk Types DNA Cervical    3. " "Screening for endocrine, nutritional, metabolic and immunity disorder  - Comprehensive metabolic panel; Future  - TSH with free T4 reflex; Future    - JUST IN CASE; Future    4. CARDIOVASCULAR SCREENING; LDL GOAL LESS THAN 100  - Lipid panel reflex to direct LDL Fasting; Future    5. Encounter for screening for HIV  - HIV Antigen Antibody Combo; Future    6. Encounter for vitamin deficiency screening  - Vitamin D Deficiency; Future    7. Encounter for hepatitis C screening test for low risk patient  - **Hepatitis C Screen Reflex to RNA FUTURE anytime; Future    8. Absence of menstruation  - HCG Qual, Urine ([id number]); Future      COUNSELING:   Reviewed preventive health counseling, as reflected in patient instructions       Regular exercise       Healthy diet/nutrition       Vision screening       Hearing screening       Contraception       Family planning       Colon cancer screening       Consider Hep C screening for all patients one time for ages 18-79 years       HIV screeninx in teen years, 1x in adult years, and at intervals if high risk    Estimated body mass index is 21.1 kg/m  as calculated from the following:    Height as of this encounter: 1.702 m (5' 7\").    Weight as of this encounter: 61.1 kg (134 lb 11.2 oz).        She reports that she has quit smoking. She has never used smokeless tobacco.      Counseling Resources:  ATP IV Guidelines  Pooled Cohorts Equation Calculator  Breast Cancer Risk Calculator  BRCA-Related Cancer Risk Assessment: FHS-7 Tool  FRAX Risk Assessment  ICSI Preventive Guidelines  Dietary Guidelines for Americans,   USDA's MyPlate  ASA Prophylaxis  Lung CA Screening    Vlad Broderick MD  [facility]  "

## 2020-12-17 LAB
COPATH REPORT: NORMAL
PAP: NORMAL

## 2020-12-18 DIAGNOSIS — Z11.4 ENCOUNTER FOR SCREENING FOR HIV: ICD-10-CM

## 2020-12-18 DIAGNOSIS — Z13.228 SCREENING FOR ENDOCRINE, NUTRITIONAL, METABOLIC AND IMMUNITY DISORDER: ICD-10-CM

## 2020-12-18 DIAGNOSIS — Z13.21 SCREENING FOR ENDOCRINE, NUTRITIONAL, METABOLIC AND IMMUNITY DISORDER: ICD-10-CM

## 2020-12-18 DIAGNOSIS — N91.2 ABSENCE OF MENSTRUATION: ICD-10-CM

## 2020-12-18 DIAGNOSIS — Z13.6 CARDIOVASCULAR SCREENING; LDL GOAL LESS THAN 100: ICD-10-CM

## 2020-12-18 DIAGNOSIS — Z13.0 SCREENING FOR ENDOCRINE, NUTRITIONAL, METABOLIC AND IMMUNITY DISORDER: ICD-10-CM

## 2020-12-18 DIAGNOSIS — Z13.29 SCREENING FOR ENDOCRINE, NUTRITIONAL, METABOLIC AND IMMUNITY DISORDER: ICD-10-CM

## 2020-12-18 DIAGNOSIS — Z11.59 ENCOUNTER FOR HEPATITIS C SCREENING TEST FOR LOW RISK PATIENT: ICD-10-CM

## 2020-12-18 LAB
ALBUMIN SERPL-MCNC: 3.7 G/DL (ref 3.4–5)
ALP SERPL-CCNC: 80 U/L (ref 40–150)
ALT SERPL W P-5'-P-CCNC: 24 U/L (ref 0–50)
ANION GAP SERPL CALCULATED.3IONS-SCNC: 2 MMOL/L (ref 3–14)
AST SERPL W P-5'-P-CCNC: 27 U/L (ref 0–45)
BILIRUB SERPL-MCNC: 0.3 MG/DL (ref 0.2–1.3)
BUN SERPL-MCNC: 7 MG/DL (ref 7–30)
CALCIUM SERPL-MCNC: 8.7 MG/DL (ref 8.5–10.1)
CHLORIDE SERPL-SCNC: 108 MMOL/L (ref 94–109)
CHOLEST SERPL-MCNC: 174 MG/DL
CO2 SERPL-SCNC: 30 MMOL/L (ref 20–32)
CREAT SERPL-MCNC: 0.71 MG/DL (ref 0.52–1.04)
FINAL DIAGNOSIS: NORMAL
GFR SERPL CREATININE-BSD FRML MDRD: >90 ML/MIN/{1.73_M2}
GLUCOSE SERPL-MCNC: 96 MG/DL (ref 70–99)
HDLC SERPL-MCNC: 68 MG/DL
HPV HR 12 DNA CVX QL NAA+PROBE: NEGATIVE
HPV16 DNA SPEC QL NAA+PROBE: NEGATIVE
HPV18 DNA SPEC QL NAA+PROBE: NEGATIVE
LDLC SERPL CALC-MCNC: 97 MG/DL
NONHDLC SERPL-MCNC: 106 MG/DL
POTASSIUM SERPL-SCNC: 4.3 MMOL/L (ref 3.4–5.3)
PROT SERPL-MCNC: 7 G/DL (ref 6.8–8.8)
SODIUM SERPL-SCNC: 140 MMOL/L (ref 133–144)
SPECIMEN DESCRIPTION: NORMAL
SPECIMEN SOURCE CVX/VAG CYTO: NORMAL
TRIGL SERPL-MCNC: 43 MG/DL
TSH SERPL DL<=0.005 MIU/L-ACNC: 1.16 MU/L (ref 0.4–4)

## 2020-12-18 PROCEDURE — 80053 COMPREHEN METABOLIC PANEL: CPT | Performed by: FAMILY MEDICINE

## 2020-12-18 PROCEDURE — 87389 HIV-1 AG W/HIV-1&-2 AB AG IA: CPT | Performed by: FAMILY MEDICINE

## 2020-12-18 PROCEDURE — 36415 COLL VENOUS BLD VENIPUNCTURE: CPT | Performed by: FAMILY MEDICINE

## 2020-12-18 PROCEDURE — 86803 HEPATITIS C AB TEST: CPT | Performed by: FAMILY MEDICINE

## 2020-12-18 PROCEDURE — 80061 LIPID PANEL: CPT | Performed by: FAMILY MEDICINE

## 2020-12-18 PROCEDURE — 84443 ASSAY THYROID STIM HORMONE: CPT | Performed by: FAMILY MEDICINE

## 2020-12-18 PROCEDURE — 82306 VITAMIN D 25 HYDROXY: CPT | Performed by: FAMILY MEDICINE

## 2020-12-19 LAB
DEPRECATED CALCIDIOL+CALCIFEROL SERPL-MC: 52 UG/L (ref 20–75)
HIV 1+2 AB+HIV1 P24 AG SERPL QL IA: NONREACTIVE

## 2020-12-20 LAB — HCV AB SERPL QL IA: NONREACTIVE

## 2021-01-04 ENCOUNTER — HEALTH MAINTENANCE LETTER (OUTPATIENT)
Age: 31
End: 2021-01-04

## 2021-04-13 ENCOUNTER — VIRTUAL VISIT (OUTPATIENT)
Dept: FAMILY MEDICINE | Facility: CLINIC | Age: 31
End: 2021-04-13
Payer: COMMERCIAL

## 2021-04-13 DIAGNOSIS — Z71.89 ADVANCED DIRECTIVES, COUNSELING/DISCUSSION: ICD-10-CM

## 2021-04-13 DIAGNOSIS — R30.0 DYSURIA: ICD-10-CM

## 2021-04-13 DIAGNOSIS — N89.8 VAGINAL DISCHARGE: Primary | ICD-10-CM

## 2021-04-13 LAB
ALBUMIN UR-MCNC: NEGATIVE MG/DL
APPEARANCE UR: CLEAR
BILIRUB UR QL STRIP: NEGATIVE
COLOR UR AUTO: YELLOW
GLUCOSE UR STRIP-MCNC: NEGATIVE MG/DL
HGB UR QL STRIP: NEGATIVE
KETONES UR STRIP-MCNC: NEGATIVE MG/DL
LEUKOCYTE ESTERASE UR QL STRIP: NEGATIVE
NITRATE UR QL: NEGATIVE
PH UR STRIP: 5.5 PH (ref 5–7)
SOURCE: NORMAL
SP GR UR STRIP: 1.01 (ref 1–1.03)
UROBILINOGEN UR STRIP-ACNC: 0.2 EU/DL (ref 0.2–1)

## 2021-04-13 PROCEDURE — 81003 URINALYSIS AUTO W/O SCOPE: CPT | Performed by: NURSE PRACTITIONER

## 2021-04-13 PROCEDURE — 99213 OFFICE O/P EST LOW 20 MIN: CPT | Mod: 95 | Performed by: NURSE PRACTITIONER

## 2021-04-13 RX ORDER — FLUCONAZOLE 150 MG/1
150 TABLET ORAL ONCE
Qty: 2 TABLET | Refills: 0 | Status: SHIPPED | OUTPATIENT
Start: 2021-04-13 | End: 2021-04-13

## 2021-04-13 ASSESSMENT — ANXIETY QUESTIONNAIRES
1. FEELING NERVOUS, ANXIOUS, OR ON EDGE: SEVERAL DAYS
6. BECOMING EASILY ANNOYED OR IRRITABLE: SEVERAL DAYS
7. FEELING AFRAID AS IF SOMETHING AWFUL MIGHT HAPPEN: NOT AT ALL
IF YOU CHECKED OFF ANY PROBLEMS ON THIS QUESTIONNAIRE, HOW DIFFICULT HAVE THESE PROBLEMS MADE IT FOR YOU TO DO YOUR WORK, TAKE CARE OF THINGS AT HOME, OR GET ALONG WITH OTHER PEOPLE: NOT DIFFICULT AT ALL
5. BEING SO RESTLESS THAT IT IS HARD TO SIT STILL: SEVERAL DAYS
2. NOT BEING ABLE TO STOP OR CONTROL WORRYING: NOT AT ALL
3. WORRYING TOO MUCH ABOUT DIFFERENT THINGS: NOT AT ALL
GAD7 TOTAL SCORE: 4

## 2021-04-13 ASSESSMENT — PATIENT HEALTH QUESTIONNAIRE - PHQ9
5. POOR APPETITE OR OVEREATING: SEVERAL DAYS
SUM OF ALL RESPONSES TO PHQ QUESTIONS 1-9: 4

## 2021-04-13 NOTE — PROGRESS NOTES
Vibha is a 31 year old who is being evaluated via a billable video visit.      How would you like to obtain your AVS? MyChart  If the video visit is dropped, the invitation should be resent by: Send to e-mail at: [email or web address]  Will anyone else be joining your video visit? No      Video Start Time: 11:53 AM    Assessment & Plan     1. Vaginal discharge    2. Dysuria    3. Advanced directives, counseling/discussion       Normal UA.   treating for yeast. Discussed rationale. Pt. Stated hygiene practices are likely cause. Discussed routine skin cares, rationale, and dry skin prevention. Return to clinic with any new or worsening symptoms, and as needed.     F/u with PCP for Nexplanon and STD screen.                  Return in about 1 week (around 4/20/2021) for re-check office visit.    EV Sidhu CNP  [facility] [place] is a 31 year old who presents for the following health issues  accompanied by her self:    HPI     Genitourinary - Female  Onset/Duration: 1 week - PATIENT LEFT URINE ALREADY  Description:   Painful urination (Dysuria): YES           Frequency: YES  Blood in urine (Hematuria): no  Delay in urine (Hesitency): YES  Intensity: moderate  Progression of Symptoms:  same  Accompanying Signs & Symptoms:  Fever/chills: no  Flank pain: no  Nausea and vomiting: no  Vaginal symptoms: discharge  Abdominal/Pelvic Pain: YES  History:   History of frequent UTI s: no  History of kidney stones: no  Sexually Active: not currenly  Possibility of pregnancy: No  Precipitating or alleviating factors: None    Therapies tried and outcome: Cranberry juice prn (contraindicated in Coumadin patients) and Increase fluid intake    Vaginal discharge, no lesions.   In monogamous relationship. Not using condoms.   runs      Review of Systems   Constitutional, HEENT, cardiovascular, pulmonary, gi and gu systems are negative, except as otherwise noted.      Objective    Vitals -  Patient Reported  Weight (Patient Reported): 59.9 kg (132 lb)  Pain Score: No Pain (0)      Vitals:  No vitals were obtained today due to virtual visit.    Physical Exam   GENERAL: Healthy, alert and no distress  EYES: Eyes grossly normal to inspection.  No discharge or erythema, or obvious scleral/conjunctival abnormalities.  RESP: No audible wheeze, cough, or visible cyanosis.  No visible retractions or increased work of breathing.    SKIN: Visible skin clear. No significant rash, abnormal pigmentation or lesions.  NEURO: Cranial nerves grossly intact.  Mentation and speech appropriate for age.  PSYCH: Mentation appears normal, affect normal/bright, judgement and insight intact, normal speech and appearance well-groomed.    UA- normal.             Video-Visit Details    Type of service:  Video Visit    Video End Time:12:09 PM    Originating Location (pt. Location): Home    Distant Location (provider location):  [facility] [place]     Platform used for Video Visit: [employer]

## 2021-04-13 NOTE — LETTER
My Depression Action Plan  Name: Vibha Paulino   Date of Birth 1990  Date: 4/13/2021    My doctor: No Ref-Primary, Physician   My clinic: [facility] [place]  [address], [address]  [place] [id number] [phone]          GREEN    ZONE   Good Control    What it looks like:     Things are going generally well. You have normal ups and downs. You may even feel depressed from time to time, but bad moods usually last less than a day.   What you need to do:  1. Continue to care for yourself (see self care plan)  2. Check your depression survival kit and update it as needed  3. Follow your physician s recommendations including any medication.  4. Do not stop taking medication unless you consult with your physician first.           YELLOW         ZONE Getting Worse    What it looks like:     Depression is starting to interfere with your life.     It may be hard to get out of bed; you may be starting to isolate yourself from others.    Symptoms of depression are starting to last most all day and this has happened for several days.     You may have suicidal thoughts but they are not constant.   What you need to do:     1. Call your care team. Your response to treatment will improve if you keep your care team informed of your progress. Yellow periods are signs an adjustment may need to be made.     2. Continue your self-care.  Just get dressed and ready for the day.  Don't give yourself time to talk yourself out of it.    3. Talk to someone in your support network.    4. Open up your Depression Self-Care Plan/Wellness Kit.           RED    ZONE Medical Alert - Get Help    What it looks like:     Depression is seriously interfering with your life.     You may experience these or other symptoms: You can t get out of bed most days, can t work or engage in other necessary activities, you have trouble taking care of basic hygiene, or basic responsibilities, thoughts of suicide or death  that will not go away, self-injurious behavior.     What you need to do:  1. Call your care team and request a same-day appointment. If they are not available (weekends or after hours) call your local crisis line, emergency room or 911.          Depression Self-Care Plan / Wellness Kit    Many people find that medication and therapy are helpful treatments for managing depression. In addition, making small changes to your everyday life can help to boost your mood and improve your wellbeing. Below are some tips for you to consider. Be sure to talk with your medical provider and/or behavioral health consultant if your symptoms are worsening or not improving.     Sleep   Sleep hygiene  means all of the habits that support good, restful sleep. It includes maintaining a consistent bedtime and wake time, using your bedroom only for sleeping or sex, and keeping the bedroom dark and free of distractions like a computer, smartphone, or television.     Develop a Healthy Routine  Maintain good hygiene. Get out of bed in the morning, make your bed, brush your teeth, take a shower, and get dressed. Don t spend too much time viewing media that makes you feel stressed. Find time to relax each day.    Exercise  Get some form of exercise every day. This will help reduce pain and release endorphins, the  feel good  chemicals in your brain. It can be as simple as just going for a walk or doing some gardening, anything that will get you moving.      Diet  Strive to eat healthy foods, including fruits and vegetables. Drink plenty of water. Avoid excessive sugar, caffeine, alcohol, and other mood-altering substances.     Stay Connected with Others  Stay in touch with friends and family members.    Manage Your Mood  Try deep breathing, massage therapy, biofeedback, or meditation. Take part in fun activities when you can. Try to find something to smile about each day.     Psychotherapy  Be open to working with a therapist if your provider  recommends it.     Medication  Be sure to take your medication as prescribed. Most anti-depressants need to be taken every day. It usually takes several weeks for medications to work. Not all medicines work for all people. It is important to follow-up with your provider to make sure you have a treatment plan that is working for you. Do not stop your medication abruptly without first discussing it with your provider.    Crisis Resources   These hotlines are for both adults and children. They and are open 24 hours a day, 7 days a week unless noted otherwise.      National Suicide Prevention Lifeline   0-185-074-NIJY (1774)      Crisis Text Line    www.crisistextline.org  Text HOME to 082962 from anywhere in the [place], anytime, about any type of crisis. A live, trained crisis counselor will receive the text and respond quickly.      [place] Lifeline for LGBTQ Youth  A national crisis intervention and suicide lifeline for LGBTQ youth under 25. Provides a safe place to talk without judgement. Call 1-291.522.1354; text START to 917134 or visit www.thetrevorproject.org to talk to a trained counselor.      For Critical access hospital crisis numbers, visit the [facility] website at:  https://[place].gov/dhs/people-we-serve/adults/health-care/mental-health/resources/crisis-contacts.jsp

## 2021-04-14 ASSESSMENT — ANXIETY QUESTIONNAIRES: GAD7 TOTAL SCORE: 4

## 2021-04-16 NOTE — PROGRESS NOTES
"Nexplanon Insertion:    Is a pregnancy test required: No.  Was a consent obtained?  Yes    Subjective: Vibha Paulino is a 31 year old No obstetric history on file. presents for Nexplanon.    Patient has been given the opportunity to ask questions about all forms of birth control, including all options appropriate for Vibha Paulino. Discussed that no method of birth control, except abstinence is 100% effective against pregnancy or sexually transmitted infection.     Vibha Paulino understands she may have the Nexplanon removed at any time and it should be removed by a health care provider.    The entire insertion procedure was reviewed with the patient, including care after placement.     Patient's last menstrual period was 03/22/2021 (exact date). Has current contraception. 03/22/2021. No allergy to betadine or shellfish. Patient desires STD screening  HCG Qual Urine   Date Value Ref Range Status   04/20/2021 Negative NEG^Negative Final     Comment:     This test is for screening purposes.  Results should be interpreted along with   the clinical picture.  Confirmation testing is available if warranted by   ordering [id number], HCG Quantitative Pregnancy.           /84 (BP Location: Left arm, Patient Position: Chair, Cuff Size: Adult Regular)   Pulse 92   Temp 98.9  F (37.2  C) (Temporal)   Resp 16   Ht 1.715 m (5' 7.5\")   Wt 60.8 kg (134 lb)   LMP 03/22/2021 (Exact Date)   SpO2 97%   Breastfeeding No   BMI 20.68 kg/m      PROCEDURE NOTE: -- Nexplanon Insertion    Reason for Insertion: contraception    Patient was placed supine with right arm exposed.  Dorian was made 8-10 cm above medial epicondyle and a guiding dorian 4 cm above the first.  Arm was prepped with Betadine. Insertion point was anesthetized with 6 mL 1% lidocaine. After stretching the skin with thumb and index finger around the insertion site, skin punctured with the tip of the needle inserted at 30 degrees and then lowered to horizontal " position. The needle was then advanced to its full length. Applicator was then stabilized and slider was unlocked. Slider was pulled back until it stopped and then removed.    Correct placement of the implant was confirmed by palpation in the patient's arm and visualizing the purple top of the obturator.   Bandage and pressure dressing applied to insertion site.    Lot # [id number]  Exp: July 2023    EBL: minimal    Complications: none    ASSESSMENT:     ICD-10-CM    1. Nexplanon insertion  Z30.017 etonogestrel (NEXPLANON) subdermal implant 68 mg   2. Contraceptive management  Z30.9 HCG Qual, Urine ([id number])   3. Screen for STD (sexually transmitted disease)  Z11.3 Chlamydia trachomatis PCR     Neisseria gonorrhoeae PCR     Herpes Simplex Virus 1 and 2 IgG     Hepatitis B Surface Antibody     HEPATITIS B CORE ANTIBODY     HEPATITIS B SURFACE ANTIGEN        PLAN:    Given 's handouts, including when to have Nexplanon removed, list of danger s/sx, side effects and follow up recommended. Encouraged condom use for prevention of STD. Back up contraception advised for 7 days. Advised to call for any fever, for prolonged or severe pain or bleeding, abnormal vaginal dischage. She was advised to use pain medications (ibuprofen) as needed for mild to moderate pain.     Vlad Broderick MD           Assessment & Plan     Nexplanon insertion  - etonogestrel (NEXPLANON) subdermal implant 68 mg    Contraceptive management  - HCG Qual, Urine ([id number])    Screen for STD (sexually transmitted disease)  - Chlamydia trachomatis PCR  - Neisseria gonorrhoeae PCR  - Herpes Simplex Virus 1 and 2 IgG  - Hepatitis B Surface Antibody  - HEPATITIS B CORE ANTIBODY  - HEPATITIS B SURFACE ANTIGEN        Return in about 1 year (around 4/20/2022) for Routine preventive.    Vlad Broderick MD  [facility]    Results for orders placed or performed in visit on 04/20/21   HCG Qual, Urine ([id number])     Status: None    Result Value Ref Range    HCG Qual Urine Negative NEG^Negative   Herpes Simplex Virus 1 and 2 IgG     Status: None   Result Value Ref Range    Herpes Simplex Virus Type 1 IgG <0.2 0.0 - 0.8 AI    Herpes Simplex Virus Type 2 IgG <0.2 0.0 - 0.8 AI   Hepatitis B Surface Antibody     Status: Abnormal   Result Value Ref Range    Hepatitis B Surface Antibody 74.38 (H) <8.00 m[IU]/mL   HEPATITIS B CORE ANTIBODY     Status: None   Result Value Ref Range    Hepatitis B Core [place] Nonreactive NR^Nonreactive   HEPATITIS B SURFACE ANTIGEN     Status: None   Result Value Ref Range    Hep B Surface Agn Nonreactive NR^Nonreactive   Chlamydia trachomatis PCR     Status: None    Specimen: Urine   Result Value Ref Range    Specimen Description Urine     Chlamydia Trachomatis PCR Negative NEG^Negative   Neisseria gonorrhoeae PCR     Status: None    Specimen: Urine   Result Value Ref Range    Specimen Descrip Urine     N Gonorrhea PCR Negative NEG^Negative             Answers for HPI/ROS submitted by the patient on 4/20/2021   If you checked off any problems, how difficult have these problems made it for you to do your work, take care of things at home, or get along with other people?: Somewhat difficult  PHQ9 TOTAL SCORE: 7  RIK 7 TOTAL SCORE: 8

## 2021-04-20 ENCOUNTER — OFFICE VISIT (OUTPATIENT)
Dept: FAMILY MEDICINE | Facility: CLINIC | Age: 31
End: 2021-04-20
Payer: COMMERCIAL

## 2021-04-20 VITALS
SYSTOLIC BLOOD PRESSURE: 122 MMHG | DIASTOLIC BLOOD PRESSURE: 84 MMHG | RESPIRATION RATE: 16 BRPM | TEMPERATURE: 98.9 F | HEIGHT: 68 IN | HEART RATE: 92 BPM | OXYGEN SATURATION: 97 % | BODY MASS INDEX: 20.31 KG/M2 | WEIGHT: 134 LBS

## 2021-04-20 DIAGNOSIS — Z11.3 SCREEN FOR STD (SEXUALLY TRANSMITTED DISEASE): ICD-10-CM

## 2021-04-20 DIAGNOSIS — Z30.017 NEXPLANON INSERTION: Primary | ICD-10-CM

## 2021-04-20 LAB — HCG UR QL: NEGATIVE

## 2021-04-20 PROCEDURE — 36415 COLL VENOUS BLD VENIPUNCTURE: CPT | Performed by: FAMILY MEDICINE

## 2021-04-20 PROCEDURE — 81025 URINE PREGNANCY TEST: CPT | Performed by: FAMILY MEDICINE

## 2021-04-20 PROCEDURE — 99207 PR DROP WITH A PROCEDURE: CPT | Performed by: FAMILY MEDICINE

## 2021-04-20 PROCEDURE — 87491 CHLMYD TRACH DNA AMP PROBE: CPT | Performed by: FAMILY MEDICINE

## 2021-04-20 PROCEDURE — 86706 HEP B SURFACE ANTIBODY: CPT | Performed by: FAMILY MEDICINE

## 2021-04-20 PROCEDURE — 86704 HEP B CORE ANTIBODY TOTAL: CPT | Performed by: FAMILY MEDICINE

## 2021-04-20 PROCEDURE — 87340 HEPATITIS B SURFACE AG IA: CPT | Performed by: FAMILY MEDICINE

## 2021-04-20 PROCEDURE — 86696 HERPES SIMPLEX TYPE 2 TEST: CPT | Performed by: FAMILY MEDICINE

## 2021-04-20 PROCEDURE — 11981 INSERTION DRUG DLVR IMPLANT: CPT | Performed by: FAMILY MEDICINE

## 2021-04-20 PROCEDURE — 87591 N.GONORRHOEAE DNA AMP PROB: CPT | Performed by: FAMILY MEDICINE

## 2021-04-20 PROCEDURE — 86695 HERPES SIMPLEX TYPE 1 TEST: CPT | Performed by: FAMILY MEDICINE

## 2021-04-20 ASSESSMENT — ANXIETY QUESTIONNAIRES
5. BEING SO RESTLESS THAT IT IS HARD TO SIT STILL: SEVERAL DAYS
7. FEELING AFRAID AS IF SOMETHING AWFUL MIGHT HAPPEN: SEVERAL DAYS
7. FEELING AFRAID AS IF SOMETHING AWFUL MIGHT HAPPEN: SEVERAL DAYS
1. FEELING NERVOUS, ANXIOUS, OR ON EDGE: MORE THAN HALF THE DAYS
2. NOT BEING ABLE TO STOP OR CONTROL WORRYING: SEVERAL DAYS
6. BECOMING EASILY ANNOYED OR IRRITABLE: SEVERAL DAYS
4. TROUBLE RELAXING: SEVERAL DAYS
GAD7 TOTAL SCORE: 8
GAD7 TOTAL SCORE: 8
3. WORRYING TOO MUCH ABOUT DIFFERENT THINGS: SEVERAL DAYS
GAD7 TOTAL SCORE: 8

## 2021-04-20 ASSESSMENT — PATIENT HEALTH QUESTIONNAIRE - PHQ9
SUM OF ALL RESPONSES TO PHQ QUESTIONS 1-9: 7
10. IF YOU CHECKED OFF ANY PROBLEMS, HOW DIFFICULT HAVE THESE PROBLEMS MADE IT FOR YOU TO DO YOUR WORK, TAKE CARE OF THINGS AT HOME, OR GET ALONG WITH OTHER PEOPLE: SOMEWHAT DIFFICULT
SUM OF ALL RESPONSES TO PHQ QUESTIONS 1-9: 7

## 2021-04-20 ASSESSMENT — PAIN SCALES - GENERAL: PAINLEVEL: NO PAIN (0)

## 2021-04-20 ASSESSMENT — MIFFLIN-ST. JEOR: SCORE: 1358.38

## 2021-04-20 NOTE — PATIENT INSTRUCTIONS
NEXPLANON AFTERCARE INSTRUCTIONS     Keep dressing on and dry for 24 hours, then remove wrap.  Replace bandaid daily for 5 days. Keep your user card in a safe place where you'll remember it.      You may have some pain at the site of the Nexplanon insertion. You can help relieve the discomfort with Tylenol (acetaminophen), Aspirin or Advil (ibuprofen). If your discomfort worsens or you notice redness spreading on the skin around the insertion site, please call the clinic.       Irregular bleeding is common with Nexplanon, especially in the first 6-12 months of use. After one year, approximately 20% of women who use Nexplanon will stop having periods completely. Some women have longer, heavier periods. Some women will have increased spotting between periods. You may find that your periods may be hard to predict.       The Nexplanon does not protect against sexually transmitted infections including the AIDS virus (HIV), warts (HPV), gonorrhea, Chlamydia, and herpes. Condoms should be used to decrease the risk sexually transmitted infections. If you think that you have been exposed to a sexually transmitted infection, please call the clinic.       If you had Nexplanon placed for birth control, it is effective immediately if it was inserted within five days after the start of your period. If you have Nexplanon inserted at any other time during your menstrual cycle, use another method of birth control, like condoms for at least 7 days.       The Nexplanon should be removed and/or replaced by a health care provider after three years.   Warning Signs   Call the clinic if any of the following occurs:     You have bleeding, pus, or increasing redness, or pain at insertion site.     You have fever or chills     The implant comes out or you have concerns about its location.     You have a positive pregnancy test or suspect you might be pregnant.

## 2021-04-21 LAB
C TRACH DNA SPEC QL NAA+PROBE: NEGATIVE
HBV CORE AB SERPL QL IA: NONREACTIVE
HBV SURFACE AB SERPL IA-ACNC: 74.38 M[IU]/ML
HBV SURFACE AG SERPL QL IA: NONREACTIVE
N GONORRHOEA DNA SPEC QL NAA+PROBE: NEGATIVE
SPECIMEN SOURCE: NORMAL
SPECIMEN SOURCE: NORMAL

## 2021-04-21 ASSESSMENT — PATIENT HEALTH QUESTIONNAIRE - PHQ9: SUM OF ALL RESPONSES TO PHQ QUESTIONS 1-9: 7

## 2021-04-21 ASSESSMENT — ANXIETY QUESTIONNAIRES: GAD7 TOTAL SCORE: 8

## 2021-04-22 LAB
HSV1 IGG SERPL QL IA: <0.2 AI (ref 0–0.8)
HSV2 IGG SERPL QL IA: <0.2 AI (ref 0–0.8)

## 2021-05-07 DIAGNOSIS — L70.0 ACNE VULGARIS: ICD-10-CM

## 2021-05-10 RX ORDER — SPIRONOLACTONE 100 MG/1
TABLET, FILM COATED ORAL
Qty: 30 TABLET | Refills: 11 | OUTPATIENT
Start: 2021-05-10

## 2021-06-03 DIAGNOSIS — L70.0 ACNE VULGARIS: ICD-10-CM

## 2021-06-03 DIAGNOSIS — N89.8 VAGINAL DISCHARGE: ICD-10-CM

## 2021-06-04 RX ORDER — FLUCONAZOLE 150 MG/1
TABLET ORAL
Qty: 2 TABLET | Refills: 0 | OUTPATIENT
Start: 2021-06-04

## 2021-06-04 NOTE — TELEPHONE ENCOUNTER
Pending Prescriptions:                       Disp   Refills    fluconazole (DIFLUCAN) 150 MG tablet [Phar*2 tabl*0        Sig: TAKE 1 TABLET(150 MG) BY MOUTH 1 TIME FOR 1 DOSE. MAY           REPEAT FOR SYMPTOMS IN 3-4 DAYS FOR 1 DOSE    Routing refill request to provider for review/approval because:  Drug not active on patient's medication list

## 2021-06-08 RX ORDER — CLINDAMYCIN PHOSPHATE 10 UG/ML
LOTION TOPICAL 2 TIMES DAILY
Qty: 60 ML | Refills: 5 | Status: SHIPPED | OUTPATIENT
Start: 2021-06-08 | End: 2022-07-27

## 2021-06-08 RX ORDER — TRETINOIN 0.5 MG/G
CREAM TOPICAL AT BEDTIME
Qty: 45 G | Refills: 5 | Status: SHIPPED | OUTPATIENT
Start: 2021-06-08 | End: 2022-07-27

## 2021-06-08 NOTE — TELEPHONE ENCOUNTER
Last Clinic Visit: 12/14/2020  [facility]    Refilled qty to 12 months from last visit (process #1/Derm protocol) both the Cleocin and Retin-A medications.

## 2021-07-28 NOTE — PROGRESS NOTES
Nexplanon Removal:     Is a pregnancy test required: No.  Was a consent obtained?  Yes    Vibha Paulino is here for removal of etonogestrel implant Nexplanon/Implanon    Indication: Removal if nexplanon for OCP due to irregular period and acne      Preoperative Diagnosis: etonogestrel implant  Postoperative Diagnosis: etonogestrel implant removed    Technique: On the right arm  Skin prep Betadine  Anesthesia 2% lidocaine  Procedure: Small incision (<5mm) was made at distal end of palpable implant, curved hemostat or mosquito forceps was used to isolate the implant and bring it to the incision, the fibrous capsule containing the implant  was incised and the Implant was removed intact.      EBL: minimal  Complications:  No  Tolerance:  Pt tolerated procedure well and was in stable condition.   Dressing:    A pressure bandage was placed for the next 12-24 hours.    Contraception was discussed and patient chose the following method oral contraceptives      Follow up: Pt was instructed to call if bleeding, severe pain or foul smell.     Vlad Broderick MD    Assessment & Plan     Nexplanon removal  Initiation of OCP (BCP)  - norgestimate-ethinyl estradiol (ORTHO-CYCLEN) 0.25-35 MG-MCG tablet; Take 1 tablet by mouth daily    Need for vaccination      40  minutes spent on the date of the encounter doing chart review, history and exam, documentation and further activities per the note      Return if symptoms worsen or fail to improve.    Vlad Broderick MD  [facility]

## 2021-07-30 ENCOUNTER — APPOINTMENT (OUTPATIENT)
Dept: FAMILY MEDICINE | Facility: CLINIC | Age: 31
End: 2021-07-30
Payer: COMMERCIAL

## 2021-07-30 ENCOUNTER — OFFICE VISIT (OUTPATIENT)
Dept: FAMILY MEDICINE | Facility: CLINIC | Age: 31
End: 2021-07-30
Payer: COMMERCIAL

## 2021-07-30 VITALS
DIASTOLIC BLOOD PRESSURE: 80 MMHG | OXYGEN SATURATION: 95 % | RESPIRATION RATE: 18 BRPM | TEMPERATURE: 98.2 F | HEART RATE: 111 BPM | HEIGHT: 68 IN | WEIGHT: 136.7 LBS | BODY MASS INDEX: 20.72 KG/M2 | SYSTOLIC BLOOD PRESSURE: 120 MMHG

## 2021-07-30 DIAGNOSIS — Z30.011 INITIATION OF OCP (BCP): ICD-10-CM

## 2021-07-30 DIAGNOSIS — Z30.46 NEXPLANON REMOVAL: Primary | ICD-10-CM

## 2021-07-30 DIAGNOSIS — Z23 NEED FOR VACCINATION: ICD-10-CM

## 2021-07-30 PROCEDURE — 99213 OFFICE O/P EST LOW 20 MIN: CPT | Performed by: FAMILY MEDICINE

## 2021-07-30 RX ORDER — NORGESTIMATE AND ETHINYL ESTRADIOL 0.25-0.035
1 KIT ORAL DAILY
Qty: 84 TABLET | Refills: 1 | Status: SHIPPED | OUTPATIENT
Start: 2021-07-30 | End: 2022-01-19

## 2021-07-30 ASSESSMENT — MIFFLIN-ST. JEOR: SCORE: 1370.63

## 2021-10-10 ENCOUNTER — HEALTH MAINTENANCE LETTER (OUTPATIENT)
Age: 31
End: 2021-10-10

## 2021-10-18 ENCOUNTER — TELEPHONE (OUTPATIENT)
Dept: DERMATOLOGY | Facility: CLINIC | Age: 31
End: 2021-10-18

## 2021-10-18 NOTE — TELEPHONE ENCOUNTER
PA Initiation    Medication: tretinoin (RETIN-A) 0.05 % external cream   Insurance Company: Other (see comments)  Pharmacy Filling the Rx: [id number] [place] [address] [address]  AT [facility] [address]  Filling Pharmacy Phone: [phone]  Filling Pharmacy Fax: [phone]  Start Date: 10/18/2021

## 2021-10-19 ENCOUNTER — TELEPHONE (OUTPATIENT)
Dept: DERMATOLOGY | Facility: CLINIC | Age: 31
End: 2021-10-19

## 2021-10-19 NOTE — TELEPHONE ENCOUNTER
Prior Authorization Not Needed per Insurance    Medication: tretinoin (RETIN-A) 0.05 % external cream--NO PA NEEDED  Insurance Company: [employer] - Phone [phone] Fax [phone]  Expected CoPay:      Pharmacy Filling the Rx: [facility] #[id number] [place], [place] - [address] AT [facility] & [facility]  Pharmacy Notified: Yes  Patient Notified: Yes **Instructed pharmacy to notify patient when script is ready to /ship.**    As of Oct 1 2021 brand name is preferred with No PA.  Pharmacy was able to process brand name.

## 2021-10-19 NOTE — TELEPHONE ENCOUNTER
Prior Authorization Approval    Authorization Effective Date: 10/18/2021  Authorization Expiration Date: 10/18/2022  Medication: tretinoin (RETIN-A) 0.05 % external cream--APPROVED  Approved Dose/Quantity:   Reference #:     Insurance Company: Other (see comments)  Expected CoPay:       CoPay Card Available:      Foundation Assistance Needed:    Which Pharmacy is filling the prescription (Not needed for infusion/clinic administered): [id number] - [facility] [address] [facility] AT [facility] OF [address]  Pharmacy Notified: Yes  Patient Notified: Yes **Instructed pharmacy to notify patient when script is ready to /ship.**    Contacted insurance plan to get approval dates since they were not listed on fax.  Per insurance rep approval dates are from 10/18/21-10/18/22    Per pharmacy they are unable to use this plan due to it being a [place] Medicaid plan and pharmacy is in [place].  Please see new encounter for the [place] Medicaid PA info.

## 2021-12-14 DIAGNOSIS — L70.0 ACNE VULGARIS: ICD-10-CM

## 2021-12-16 RX ORDER — SPIRONOLACTONE 100 MG/1
100 TABLET, FILM COATED ORAL DAILY
Qty: 30 TABLET | Refills: 0 | Status: SHIPPED | OUTPATIENT
Start: 2021-12-16 | End: 2022-01-21

## 2021-12-16 NOTE — TELEPHONE ENCOUNTER
SPIRONOLACTONE 100MG TABLETS      Last Written Prescription Date:  12/14/20  Last Fill Quantity: 30,   # refills: 11  Last Office Visit : 12/14/20 recommended 1 year follow up  Future Office visit:  12/20/21    Routing refill request to provider for review/approval because:  Failed derm protocol process #2.  Can 1 refill be provided to get to appointment?

## 2021-12-16 NOTE — TELEPHONE ENCOUNTER
Received refill request for spironolactone as the resident on call. Reviewed patient's chart and attached communication. After reviewing the medication list and assessment and plan from last visit, the refill request was accepted for one month s supply to get patient through to 12/20/21 appointment.       Jamey Slade MD

## 2022-01-15 DIAGNOSIS — L70.0 ACNE VULGARIS: ICD-10-CM

## 2022-01-15 DIAGNOSIS — Z30.011 INITIATION OF OCP (BCP): ICD-10-CM

## 2022-01-19 RX ORDER — NORGESTIMATE AND ETHINYL ESTRADIOL 0.25-0.035
KIT ORAL
Qty: 84 TABLET | Refills: 1 | Status: SHIPPED | OUTPATIENT
Start: 2022-01-19 | End: 2022-04-26

## 2022-01-19 RX ORDER — SPIRONOLACTONE 100 MG/1
TABLET, FILM COATED ORAL
Qty: 30 TABLET | Refills: 0 | OUTPATIENT
Start: 2022-01-19

## 2022-01-19 NOTE — TELEPHONE ENCOUNTER
spironolactone (ALDACTONE) 100 MG tablet   Take 1 tablet (100 mg) by mouth daily      Last Written Prescription Date:  12/16/21  Last Fill Quantity: 30,   # refills: 0  Last Office Visit : 12/14/20  Follow-up in 1 year, earlier for new or changing lesions.   Future Office visit:  none    Process # 2    Refill denied. Failed protocol. Last visit > 6 months. Previous adelita. No future appointment scheduled.     Scheduling has been notified to contact the pt for appointment.

## 2022-01-20 ENCOUNTER — TELEPHONE (OUTPATIENT)
Dept: DERMATOLOGY | Facility: CLINIC | Age: 32
End: 2022-01-20
Payer: COMMERCIAL

## 2022-01-20 NOTE — TELEPHONE ENCOUNTER
[facility] Call Center    Phone Message    May a detailed message be left on voicemail: no     Reason for Call: Medication Refill Request    Has the patient contacted the pharmacy for the refill? Yes     Name of medication being requested:   spironolactone (ALDACTONE) 100 MG tablet     Provider who prescribed the medication: Dr Saunders    Pharmacy:   [facility] #[id number] [address] AT [facility] [address]    Date medication is needed: asap     Pt aware of 72-business hour turn around time on refill requests    Action Taken: Message routed to:  Adult Clinics: Dermatology p [id number]    Travel Screening: Not Applicable    Pt is scheduled for 1st available with Dr Saunders on 3/7/22. Pt requesting refill to bridge until appt.

## 2022-01-21 RX ORDER — SPIRONOLACTONE 100 MG/1
100 TABLET, FILM COATED ORAL DAILY
Qty: 90 TABLET | Refills: 0 | Status: SHIPPED | OUTPATIENT
Start: 2022-01-21 | End: 2022-04-20

## 2022-01-21 NOTE — TELEPHONE ENCOUNTER
Prior Authorization Not Needed per Insurance    Medication: tretinoin (RETIN-A) 0.05 % external cream--NO PA NEEDED  Insurance Company: [employer] - Phone [phone] Fax [phone]  Expected CoPay:      Pharmacy Filling the Rx: [facility] #[id number] [place], [place] - [address] AT [facility] & [facility]  Pharmacy Notified: Yes  Patient Notified: Yes **Instructed pharmacy to notify patient when script is ready to /ship.**    Insurance prefers brand name.

## 2022-01-21 NOTE — TELEPHONE ENCOUNTER
Patient now has appointment scheduled for 3-7-2022 and is on the waiting list for an earlier appointment.    Creat, Potassium and sodium overdue.  Routed to [facility] for 90 day adelita to be sent to the pharmacy if appropriate.      Kathleen M Doege RN

## 2022-01-21 NOTE — TELEPHONE ENCOUNTER
Spironolactone refill approved to bridge patient until follow-up appointment on 3/7/2022.    Laura Garcia DO PGY-3  Department of Dermatology  [facility]    CC Dr. Saunders as FYI only

## 2022-01-30 ENCOUNTER — HEALTH MAINTENANCE LETTER (OUTPATIENT)
Age: 32
End: 2022-01-30

## 2022-04-18 DIAGNOSIS — L70.0 ACNE VULGARIS: ICD-10-CM

## 2022-04-20 RX ORDER — SPIRONOLACTONE 100 MG/1
100 TABLET, FILM COATED ORAL DAILY
Qty: 30 TABLET | Refills: 0 | Status: SHIPPED | OUTPATIENT
Start: 2022-04-20 | End: 2024-03-20

## 2022-04-20 NOTE — TELEPHONE ENCOUNTER
SPIRONOLACTONE 100MG TABLETS      Last Written Prescription Date:  1-21-22  Last Fill Quantity: 90,   # refills: 0  Last Office Visit : 12-14-20 ( RTC 1 Y)  Future Office visit:  5-16-22  Derm process 2 ( RTC 6 M)    Routing refill request to provider for review/approval because:  Overdue lab: Cyndee Rodriges  Previous FLYNN- adelita PRUETT til [facility]  Cancel 3-7-22 appt, no show 12-20-21 appt  ? FLYNN Gonzales at [facility]

## 2022-04-20 NOTE — TELEPHONE ENCOUNTER
Received refill request as CONCEPCION. Chart (including notes and pertinent labs) reviewed, courtesy 30-day refill provided. Clinic pool and attending Dr. Saunders CC'd as FYI.     Zakia Faith MD (PGY4)  Dermatology Resident

## 2022-04-22 DIAGNOSIS — Z30.011 INITIATION OF OCP (BCP): ICD-10-CM

## 2022-04-22 DIAGNOSIS — L70.0 ACNE VULGARIS: ICD-10-CM

## 2022-04-26 RX ORDER — SPIRONOLACTONE 100 MG/1
TABLET, FILM COATED ORAL
Qty: 30 TABLET | Refills: 0 | OUTPATIENT
Start: 2022-04-26

## 2022-04-26 RX ORDER — NORGESTIMATE AND ETHINYL ESTRADIOL 0.25-0.035
KIT ORAL
Qty: 84 TABLET | Refills: 0 | Status: SHIPPED | OUTPATIENT
Start: 2022-04-26 | End: 2022-05-04

## 2022-04-26 NOTE — TELEPHONE ENCOUNTER
SPIRONOLACTONE 100MG TABLETS     Take 1 tablet (100 mg) by mouth daily PLEASE KEEP 5-16-22 CLINIC APPT FOR REFILLS, NO FUTURE REFILLS IF APPT IS MISSED  Last Written Prescription Date:  4/20/22  Last Fill Quantity: 30,   # refills: 0>  [facility][id number] - [place], [place] - [address] AT [facility] & [facility]       Last Office Visit : 12/14/20  Future Office visit:  5/16/22    Denied- refills at next appt.    4/18/22Chart (including notes and pertinent labs) reviewed, courtesy 30-day refill provided

## 2022-05-02 DIAGNOSIS — Z30.011 INITIATION OF OCP (BCP): ICD-10-CM

## 2022-05-02 NOTE — PROGRESS NOTES
SUBJECTIVE:   CC: Vibha Paulino is an 32 year old woman who presents for preventive health visit.       Patient has been advised of split billing requirements and indicates understanding: Yes  HPI        No concerns    Today's PHQ-2 Score:   PHQ-2 ( 1999 Pfizer) 4/13/2021   Q1: Little interest or pleasure in doing things 0   Q2: Feeling down, depressed or hopeless 0   PHQ-2 Score 0   PHQ-2 Total Score (12-17 Years)- Positive if 3 or more points; Administer PHQ-A if positive 0       Abuse: Current or Past (Physical, Sexual or Emotional) - No  Do you feel safe in your environment? Yes        Social History     Tobacco Use     Smoking status: Former Smoker     Smokeless tobacco: Never Used   Substance Use Topics     Alcohol use: Never     If you drink alcohol do you typically have >3 drinks per day or >7 drinks per week? No    No flowsheet data found.No flowsheet data found.    Reviewed orders with patient.  Reviewed health maintenance and updated orders accordingly - Yes      Breast Cancer Screening:    FHS-7: No flowsheet data found.    Patient under 40 years of age: Routine Mammogram Screening not recommended.   Pertinent mammograms are reviewed under the imaging tab.    History of abnormal Pap smear: NO - age 30-65 PAP every 5 years with negative HPV co-testing recommended  PAP / HPV Latest Ref Rng & Units 12/15/2020   PAP (Historical) - NIL   HPV16 NEG:Negative Negative   HPV18 NEG:Negative Negative   HRHPV NEG:Negative Negative     Reviewed and updated as needed this visit by clinical staff                    Reviewed and updated as needed this visit by Provider                       Review of Systems  CONSTITUTIONAL: NEGATIVE for fever, chills, change in weight  INTEGUMENTARU/SKIN: NEGATIVE for worrisome rashes, moles or lesions  EYES: NEGATIVE for vision changes or irritation  ENT: NEGATIVE for ear, mouth and throat problems  RESP: NEGATIVE for significant cough or SOB  BREAST: NEGATIVE for masses,  "tenderness or discharge  CV: NEGATIVE for chest pain, palpitations or peripheral edema  GI: NEGATIVE for nausea, abdominal pain, heartburn, or change in bowel habits  : NEGATIVE for unusual urinary or vaginal symptoms. Periods are regular.  MUSCULOSKELETAL: NEGATIVE for significant arthralgias or myalgia  NEURO: NEGATIVE for weakness, dizziness or paresthesias  ENDOCRINE: NEGATIVE for temperature intolerance, skin/hair changes  HEME/ALLERGY/IMMUNE: NEGATIVE for bleeding problems  PSYCHIATRIC: NEGATIVE for changes in mood or affect     OBJECTIVE:   /87 (BP Location: Right arm, Patient Position: Sitting, Cuff Size: Adult Regular)   Pulse 80   Ht 1.708 m (5' 7.25\")   Wt 62.3 kg (137 lb 5 oz)   LMP 04/20/2022 (Approximate)   BMI 21.35 kg/m    Physical Exam  GENERAL: healthy, alert and no distress  EYES: Eyes grossly normal to inspection, PERRL and conjunctivae and sclerae normal  HENT: ear canals and TM's normal, nose and mouth without ulcers or lesions  NECK: no adenopathy, no asymmetry, masses, or scars and thyroid normal to palpation  RESP: lungs clear to auscultation - no rales, rhonchi or wheezes  BREAST: normal without masses, tenderness or nipple discharge and no palpable axillary masses or adenopathy  CV: regular rate and rhythm, normal S1 S2, no S3 or S4, no murmur, click or rub, no peripheral edema and peripheral pulses strong  ABDOMEN: soft, nontender, no hepatosplenomegaly, no masses and bowel sounds normal  MS: no gross musculoskeletal defects noted, no edema  SKIN: no suspicious lesions or rashes  NEURO: Normal strength and tone, mentation intact and speech normal  PSYCH: mentation appears normal, affect normal/bright        ASSESSMENT/PLAN:   (Z01.419) Well woman exam  (primary encounter diagnosis)  Comment:   Plan:     (Z30.09) General counseling for prescription of oral contraceptives  Comment:   Plan:     Denies contraindications to COCs    COUNSELING:  Reviewed preventive health " "counseling, as reflected in patient instructions       Hearing screening       Contraception       Family planning    Estimated body mass index is 21.09 kg/m  as calculated from the following:    Height as of 7/30/21: 1.715 m (5' 7.5\").    Weight as of 7/30/21: 62 kg (136 lb 11.2 oz).        She reports that she has quit smoking. She has never used smokeless tobacco.      Counseling Resources:  ATP IV Guidelines  Pooled Cohorts Equation Calculator  Breast Cancer Risk Calculator  BRCA-Related Cancer Risk Assessment: FHS-7 Tool  FRAX Risk Assessment  ICSI Preventive Guidelines  Dietary Guidelines for Americans, 2010  USDA's MyPlate  ASA Prophylaxis  Lung CA Screening    [facility] [place]  "

## 2022-05-03 RX ORDER — NORGESTIMATE AND ETHINYL ESTRADIOL 0.25-0.035
KIT ORAL
Qty: 84 TABLET | Refills: 0 | OUTPATIENT
Start: 2022-05-03

## 2022-05-04 ENCOUNTER — OFFICE VISIT (OUTPATIENT)
Dept: OBGYN | Facility: CLINIC | Age: 32
End: 2022-05-04
Payer: COMMERCIAL

## 2022-05-04 VITALS
DIASTOLIC BLOOD PRESSURE: 87 MMHG | BODY MASS INDEX: 21.55 KG/M2 | HEART RATE: 80 BPM | WEIGHT: 137.31 LBS | HEIGHT: 67 IN | SYSTOLIC BLOOD PRESSURE: 128 MMHG

## 2022-05-04 DIAGNOSIS — Z01.419 WELL WOMAN EXAM: Primary | ICD-10-CM

## 2022-05-04 DIAGNOSIS — Z30.09 GENERAL COUNSELING FOR PRESCRIPTION OF ORAL CONTRACEPTIVES: ICD-10-CM

## 2022-05-04 PROBLEM — K90.0 CELIAC DISEASE: Status: ACTIVE | Noted: 2022-05-04

## 2022-05-04 PROCEDURE — 99385 PREV VISIT NEW AGE 18-39: CPT | Performed by: ADVANCED PRACTICE MIDWIFE

## 2022-05-04 RX ORDER — NORGESTIMATE AND ETHINYL ESTRADIOL 0.25-0.035
1 KIT ORAL DAILY
Qty: 84 TABLET | Refills: 3 | Status: SHIPPED | OUTPATIENT
Start: 2022-05-04 | End: 2024-03-20

## 2022-05-16 DIAGNOSIS — L70.0 ACNE VULGARIS: ICD-10-CM

## 2022-05-16 NOTE — TELEPHONE ENCOUNTER
spironolactone (ALDACTONE) 100 MG tablet  Last Written Prescription Date:   4/20/2022-5/20/2022  Last Fill Quantity: 30,   # refills: 0  Last Office Visit :  12/14/2022  Future Office visit:  9/26/2022    Routing refill request to provider for review/approval because:  Gaps in refills.   But, Pt has visit in Sept 2022.  Refer to Provider for review       Charmaine Atkins RN  Central Triage Red Flags/Med Refills

## 2022-05-16 NOTE — TELEPHONE ENCOUNTER
[facility] Call Center    Phone Message    May a detailed message be left on voicemail: no     Reason for Call: Other: Covid Positive on 5/15/22/ rescheduled appt for 5/16 to 9/26.  Please refill: spironolactone (ALDACTONE) 100 MG tablet/ [facility] in [place] [facility]     Action Taken: Message routed to:  Other: [facility]    Travel Screening: Not Applicable

## 2022-05-17 RX ORDER — SPIRONOLACTONE 100 MG/1
100 TABLET, FILM COATED ORAL DAILY
OUTPATIENT
Start: 2022-05-17

## 2022-05-17 NOTE — TELEPHONE ENCOUNTER
Received refill request as CONCEPCION.   Last dermatology clinic note reviewed. Last clinic visit 12/2020. Refill request denied because patient needs clinic appointment prior to refill.  Clinic appointment during May 2022 had to be rescheduled d/t COVID and now not until 9/2022. Schedulers, can we set her up for a virtual visit sooner than 9/2022?     Attending Dr. Saunders CC'doreen as KIRSTEN.      Vibha Pichardo MD  Dermatology Resident

## 2022-06-08 ENCOUNTER — TELEPHONE (OUTPATIENT)
Dept: DERMATOLOGY | Facility: CLINIC | Age: 32
End: 2022-06-08
Payer: COMMERCIAL

## 2022-09-18 ENCOUNTER — HEALTH MAINTENANCE LETTER (OUTPATIENT)
Age: 32
End: 2022-09-18

## 2022-12-08 RX ORDER — NORGESTIMATE AND ETHINYL ESTRADIOL 0.25-0.035
1 KIT ORAL DAILY
Qty: 84 TABLET | Refills: 3 | Status: CANCELLED | OUTPATIENT
Start: 2022-12-08

## 2022-12-08 NOTE — TELEPHONE ENCOUNTER
[facility] Call Center    Phone Message    May a detailed message be left on voicemail: yes     Reason for Call: Medication Refill Request    Has the patient contacted the pharmacy for the refill? Yes   Name of medication being requested: norgestimate-ethinyl estradiol (ESTARYLLA) 0.25-35 MG-MCG tablet  Provider who prescribed the medication: Jyoti Saleh  Pharmacy: [facility] [address] in [place]  Date medication is needed: Pt is requesting enough of a refill to get by until she can be seen in February.  Thanks.      Action Taken: Message routed to:  [facility] p [id number]    Travel Screening: Not Applicable

## 2022-12-08 NOTE — TELEPHONE ENCOUNTER
norgestimate-ethinyl estradiol (ESTARYLLA) 0.25-35 MG-MCG tablet was sent to the pharmacy on 5/4/22 with 84 day supply with 3 additional refills.  Pt should have enough refills to get her through until her appt on 2/7/23.    Sending pt a [employer] message to confirm that she reached out to the pharmacy for a refill.    Aline Grady RN

## 2022-12-09 NOTE — TELEPHONE ENCOUNTER
Attempted to call pt to leave message that refills should be remaining at her pharmacy. Pt did not answer and VM is full.    Pt has not read [email or web address].    Refills remain at patient's pharmacy. Refill not appropriate at this time, because there are additional refills remaining on current prescription      Noreen Castelan RN on 12/9/2022 at 12:15 PM

## 2023-07-30 ENCOUNTER — HEALTH MAINTENANCE LETTER (OUTPATIENT)
Age: 33
End: 2023-07-30

## 2023-09-14 RX ORDER — NORGESTIMATE AND ETHINYL ESTRADIOL 0.25-0.035
1 KIT ORAL DAILY
Qty: 84 TABLET | Refills: 3 | Status: CANCELLED | OUTPATIENT
Start: 2023-09-14

## 2023-09-14 NOTE — TELEPHONE ENCOUNTER
"Requested Prescriptions   Pending Prescriptions Disp Refills    norgestimate-ethinyl estradiol (ESTARYLLA) 0.25-35 MG-MCG tablet 84 tablet 3     Sig: Take 1 tablet by mouth daily       Contraceptives Protocol Failed - 9/14/2023  3:38 PM        Failed - Recent (12 mo) or future (30 days) visit within the authorizing provider's specialty     Patient has had an office visit with the authorizing provider or a provider within the authorizing providers department within the previous 12 mos or has a future within next 30 days. See \"Patient Info\" tab in inbasket, or \"Choose Columns\" in Meds & Orders section of the refill encounter.              Passed - Patient is not a current smoker if age is 35 or older        Passed - Medication is active on med list        Passed - No active pregnancy on record        Passed - No positive pregnancy test in past 12 months           Pt last saw Jyoti Saleh, EV CN, on 5/4/22.  She had no showed an appt with Dr. Agbeh in April of 2023.  Jyoti no longer works for [employer] so pt needs to establish care with another provider. Pt is scheduled to see Dr. Samson on 10/4.  Also, this prescription has not been prescribed in over a year so not sure if pt has had a break in taking them.    Sending pt a [employer] message.    Aline Grady RN    "

## 2023-09-18 NOTE — TELEPHONE ENCOUNTER
Pt does have an annual physical with Dr. Samson on 10/4/23.    Attempted to reach pt by phone to see if she has been taking the requested medication as it was prescribed back in May of 2022 and she would have been out in April or May of this year or if she has had a few months break in the OCP.     Unable to reach patient via phone. Left message to call back at [phone].    Aline Grady RN

## 2023-09-18 NOTE — TELEPHONE ENCOUNTER
Pt returned call and will have phone on her. Please call pt back to discuss medication. Phone number verified. Thank you.

## 2023-09-21 NOTE — TELEPHONE ENCOUNTER
Spoke with pt and she confirms that she was taking the birth control pill but ran out about a month or 2 ago.  She would like to start taking birth control again.  I let pt know that Jyoti Saleh, EV HACKETT, no longer works for [employer] and if she wants to start back up on BCP she will need to schedule an appt with either an FP or OB/GYN provider.    Pt will call and schedule an appt.    Aline Grady RN

## 2024-03-20 ENCOUNTER — OFFICE VISIT (OUTPATIENT)
Dept: DERMATOLOGY | Facility: CLINIC | Age: 34
End: 2024-03-20
Payer: COMMERCIAL

## 2024-03-20 VITALS — DIASTOLIC BLOOD PRESSURE: 89 MMHG | HEART RATE: 112 BPM | SYSTOLIC BLOOD PRESSURE: 124 MMHG

## 2024-03-20 DIAGNOSIS — Z51.81 MEDICATION MONITORING ENCOUNTER: Primary | ICD-10-CM

## 2024-03-20 DIAGNOSIS — L70.0 ACNE VULGARIS: ICD-10-CM

## 2024-03-20 LAB
BUN SERPL-MCNC: 7.6 MG/DL (ref 6–20)
CREAT SERPL-MCNC: 1.27 MG/DL (ref 0.51–0.95)
EGFRCR SERPLBLD CKD-EPI 2021: 56 ML/MIN/1.73M2
POTASSIUM SERPL-SCNC: 4.6 MMOL/L (ref 3.4–5.3)

## 2024-03-20 PROCEDURE — 36415 COLL VENOUS BLD VENIPUNCTURE: CPT | Performed by: PHYSICIAN ASSISTANT

## 2024-03-20 PROCEDURE — 84520 ASSAY OF UREA NITROGEN: CPT | Performed by: PHYSICIAN ASSISTANT

## 2024-03-20 PROCEDURE — 84132 ASSAY OF SERUM POTASSIUM: CPT | Performed by: PHYSICIAN ASSISTANT

## 2024-03-20 PROCEDURE — 99204 OFFICE O/P NEW MOD 45 MIN: CPT | Performed by: PHYSICIAN ASSISTANT

## 2024-03-20 PROCEDURE — 82565 ASSAY OF CREATININE: CPT | Performed by: PHYSICIAN ASSISTANT

## 2024-03-20 RX ORDER — TRETINOIN 0.25 MG/G
CREAM TOPICAL
Qty: 45 G | Refills: 1 | Status: SHIPPED | OUTPATIENT
Start: 2024-03-20 | End: 2024-09-19

## 2024-03-20 RX ORDER — SPIRONOLACTONE 50 MG/1
50 TABLET, FILM COATED ORAL DAILY
Qty: 90 TABLET | Refills: 1 | Status: SHIPPED | OUTPATIENT
Start: 2024-03-20 | End: 2024-09-19

## 2024-03-20 RX ORDER — CLINDAMYCIN PHOSPHATE 10 UG/ML
LOTION TOPICAL 2 TIMES DAILY
Qty: 60 ML | Refills: 1 | Status: SHIPPED | OUTPATIENT
Start: 2024-03-20

## 2024-03-20 ASSESSMENT — PAIN SCALES - GENERAL: PAINLEVEL: NO PAIN (0)

## 2024-03-20 NOTE — NURSING NOTE
Vibha Paulino's chief complaint for this visit includes:  Chief Complaint   Patient presents with    Acne     Has been off birth control for 1 year. No prescriptions currently. Has used spironolactone in the past and worked well. Has had acne for 1 year now     PCP: No Ref-Primary, Physician    Referring Provider:  Referred Self, MD  No address on file    There were no vitals taken for this visit.  No Pain (0)      No Known Allergies      Do you need any medication refills at today's visit?    No

## 2024-03-20 NOTE — LETTER
3/20/2024         RE: Vibha Paulino  [address]  [place] [address]        Dear Colleague,    Thank you for referring your patient, Vibha Paulino, to the [facility]. Please see a copy of my visit note below.    [facility] Dermatology Note  Encounter Date: Mar 20, 2024  Office Visit      Dermatology Problem List:    Acne Vulgaris  - Tx: Spironolactone 50 mg BID, Clindamycin, & tretinoin 0.025% cream.  - Previous: OCPs, tretinoin 0.5% cream     Social: Has a toddler son and teenage son  ____________________________________________    Assessment & Plan:  # Acne vulgaris. Hormonal component. On going for the past year. Has used spironolactone in the past with relief. Is not currently on any birth control. Had good control of acne prior to coming off OCPs and spironolactone with most recent pregnancy.  - Start Spironolactone 50mg daily which may be increased to twice daily after one week as tolerated.  - Side effects of spironolactone discussed including postural hypotension, increased frequency of urination, breast tenderness, menstrual spotting, cardiac arrythmias and the need for contraception due to fetal feminization.    - Method of contraception:  vasectomy.   - Baseline blood pressure, potassium, and BUN/Cr obtained.  - There is no family history or personal history of breast malignancy known to patient.  - Also started her on tretinoin 0.025% cream nightly - increase use with tolerance. SEs discussed  - Restart her dose of her clindamycin 1% lotion to use daily, new Rx sent  - continue gentle cleanser and moisutrizer    Procedures Performed:   None     Follow-up: 6 month(s) in-person, or earlier for new or changing lesions    Staff and scribe:    I, Lori Barton, am serving as a scribe to document services personally performed by Liliam Tsai PA-C based on data collection and the provider's statements to me.      All risks,  benefits and alternatives were discussed with patient.  Patient is in agreement and understands the assessment and plan.  All questions were answered.    Liliam Tsai PA-C, MPAS  [facility] [place]: Phone: [phone], Fax: [phone]  [facility]: Phone: [phone],  Fax: [phone]  [facility]: Phone: [phone], Fax: [phone]  ____________________________________________    CC: Acne (Has been off birth control for 1 year. No prescriptions currently. Has used spironolactone in the past and worked well. Has had acne for 1 year now)      Reviewed patients past medical history and pertinent chart review prior to patient's visit today.     HPI:  Ms. Vibha Paulino is a 34 year old female who presents today as a new patient for an ance evaluation. Last seen by Dr. Saunders in 2020.    Today patient reported that she used spironolactone in the past and it worked really well for her acne. Has been off birth control x 1 year. Previously OCPs worked well for her also. Had baby so had to come off everything for a while and her acne has returned. She gets acne on her face, jaw line, chest and back, worse just before she get her period.    Patient is otherwise feeling well, without additional concerns.    Labs:  BUN/Cr and K+ obtained today    Physical Exam:  Vitals: BP: 124/89  SKIN: Acne exam, which includes the face, neck, upper central chest, and upper central back was performed.   - There are superifical acneiform papules with intermixed open and closed comedones on the face, chest and upper back  - No other lesions of concern on areas examined.     Medications:  Current Outpatient Medications   Medication     buPROPion (WELLBUTRIN XL) 300 MG 24 hr tablet     clindamycin (CLEOCIN T) 1 % external lotion     norgestimate-ethinyl estradiol (ESTARYLLA) 0.25-35 MG-MCG tablet     spironolactone (ALDACTONE) 100  MG tablet     tretinoin (RETIN-A) 0.05 % external cream     No current facility-administered medications for this visit.      Past Medical/Surgical History:   Patient Active Problem List   Diagnosis     Episodic mood disorder (H24)     Episode of recurrent major depressive disorder, unspecified depression episode severity (H24)     RIK (generalized anxiety disorder)     History of bulimia nervosa     Polysubstance overdose     Celiac disease     Past Medical History:   Diagnosis Date     Acne vulgaris                         Again, thank you for allowing me to participate in the care of your patient.        Sincerely,        Liliam Tsai PA-C

## 2024-03-20 NOTE — PROGRESS NOTES
[facility] Dermatology Note  Encounter Date: Mar 20, 2024  Office Visit      Dermatology Problem List:    Acne Vulgaris  - Tx: Spironolactone 50 mg BID, Clindamycin, & tretinoin 0.025% cream.  - Previous: OCPs, tretinoin 0.5% cream     Social: Has a toddler son and teenage son  ____________________________________________    Assessment & Plan:  # Acne vulgaris. Hormonal component. On going for the past year. Has used spironolactone in the past with relief. Is not currently on any birth control. Had good control of acne prior to coming off OCPs and spironolactone with most recent pregnancy.  - Start Spironolactone 50mg daily which may be increased to twice daily after one week as tolerated.  - Side effects of spironolactone discussed including postural hypotension, increased frequency of urination, breast tenderness, menstrual spotting, cardiac arrythmias and the need for contraception due to fetal feminization.    - Method of contraception:  vasectomy.   - Baseline blood pressure, potassium, and BUN/Cr obtained.  - There is no family history or personal history of breast malignancy known to patient.  - Also started her on tretinoin 0.025% cream nightly - increase use with tolerance. SEs discussed  - Restart her dose of her clindamycin 1% lotion to use daily, new Rx sent  - continue gentle cleanser and moisutrizer    Procedures Performed:   None     Follow-up: 6 month(s) in-person, or earlier for new or changing lesions    Staff and scribe:    ILori, am serving as a scribe to document services personally performed by Liliam Tsai PA-C based on data collection and the provider's statements to me.      All risks, benefits and alternatives were discussed with patient.  Patient is in agreement and understands the assessment and plan.  All questions were answered.    Liliam Tsai PA-C, MPAS  [facility]:  Phone: [phone], Fax: [phone]  [facility]: Phone: [phone],  Fax: [phone]  [facility]: Phone: [phone], Fax: [phone]  ____________________________________________    CC: Acne (Has been off birth control for 1 year. No prescriptions currently. Has used spironolactone in the past and worked well. Has had acne for 1 year now)      Reviewed patients past medical history and pertinent chart review prior to patient's visit today.     HPI:  Ms. Vibha Paulino is a 34 year old female who presents today as a new patient for an ance evaluation. Last seen by Dr. Saunders in 2020.    Today patient reported that she used spironolactone in the past and it worked really well for her acne. Has been off birth control x 1 year. Previously OCPs worked well for her also. Had baby so had to come off everything for a while and her acne has returned. She gets acne on her face, jaw line, chest and back, worse just before she get her period.    Patient is otherwise feeling well, without additional concerns.    Labs:  BUN/Cr and K+ obtained today    Physical Exam:  Vitals: BP: 124/89  SKIN: Acne exam, which includes the face, neck, upper central chest, and upper central back was performed.   - There are superifical acneiform papules with intermixed open and closed comedones on the face, chest and upper back  - No other lesions of concern on areas examined.     Medications:  Current Outpatient Medications   Medication    buPROPion (WELLBUTRIN XL) 300 MG 24 hr tablet    clindamycin (CLEOCIN T) 1 % external lotion    norgestimate-ethinyl estradiol (ESTARYLLA) 0.25-35 MG-MCG tablet    spironolactone (ALDACTONE) 100 MG tablet    tretinoin (RETIN-A) 0.05 % external cream     No current facility-administered medications for this visit.      Past Medical/Surgical History:   Patient Active Problem List   Diagnosis    Episodic mood disorder (H24)    Episode of recurrent major  depressive disorder, unspecified depression episode severity (H24)    RIK (generalized anxiety disorder)    History of bulimia nervosa    Polysubstance overdose    Celiac disease     Past Medical History:   Diagnosis Date    Acne vulgaris

## 2024-03-23 ENCOUNTER — TELEPHONE (OUTPATIENT)
Dept: DERMATOLOGY | Facility: CLINIC | Age: 34
End: 2024-03-23
Payer: COMMERCIAL

## 2024-03-28 ENCOUNTER — TELEPHONE (OUTPATIENT)
Dept: FAMILY MEDICINE | Facility: CLINIC | Age: 34
End: 2024-03-28
Payer: COMMERCIAL

## 2024-03-28 NOTE — TELEPHONE ENCOUNTER
Patient Contact    Attempt # 1    Was call answered?  No.  Left message on voicemail with information to call nurse back at [phone]. Advised Liliam sent a message via my chart last week if would like to review message about lab results.    Balbina STALLINGS RN  [facility]  [phone]

## 2024-03-28 NOTE — TELEPHONE ENCOUNTER
----- Message from Liliam Tsai PA-C sent at 3/27/2024  6:31 PM CDT -----  Regarding: call pt  Will you call this patient and let her know her lab results and what I wrote to her in [facility]? She has not viewed it yet and I am going on vacation and want to make sure she doesn't have any other questions. Thank you!    Brit    You kidney tests are a bit off. Let's recheck this again before we start the spironolactone pills. You can start the topical acne medications no problem, but lets wait a week and have you come back to recheck the same blood work. It may because of dehydration or something similar. So get nice and hydrated and let's check the kidneys again in a week, if things look good I will give you the green light to begin the spironolactone.

## 2024-04-01 NOTE — TELEPHONE ENCOUNTER
Pt called and lab appointment scheduled for tomorrow. Pt advised to stay hydrated for labs. Pt denied having any questions or concerns.    Eve Barragan RN on 4/1/2024 at 8:28 AM

## 2024-04-02 ENCOUNTER — LAB (OUTPATIENT)
Dept: LAB | Facility: CLINIC | Age: 34
End: 2024-04-02
Payer: COMMERCIAL

## 2024-04-02 ENCOUNTER — VIRTUAL VISIT (OUTPATIENT)
Dept: URGENT CARE | Facility: CLINIC | Age: 34
End: 2024-04-02
Payer: COMMERCIAL
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
## 2024-04-02 DIAGNOSIS — Z51.81 MEDICATION MONITORING ENCOUNTER: ICD-10-CM

## 2024-04-02 LAB
BUN SERPL-MCNC: 12.3 MG/DL (ref 6–20)
CREAT SERPL-MCNC: 0.74 MG/DL (ref 0.51–0.95)
EGFRCR SERPLBLD CKD-EPI 2021: >90 ML/MIN/1.73M2

## 2024-04-02 PROCEDURE — 84520 ASSAY OF UREA NITROGEN: CPT

## 2024-04-02 PROCEDURE — 36415 COLL VENOUS BLD VENIPUNCTURE: CPT

## 2024-04-02 PROCEDURE — 99441 PR PHYSICIAN TELEPHONE EVALUATION 5-10 MIN: CPT | Mod: 93

## 2024-04-02 PROCEDURE — 82565 ASSAY OF CREATININE: CPT

## 2024-04-02 NOTE — PROGRESS NOTES
Phone appointment:  Duration: 5 minutes        CHIEF COMPLAINT: Dog bite right middle finger      HPI: Patient is a 34-year-old female who noticed a dog bite on her right middle finger about a week ago.  Is gotten slightly reddened and concerned about infection.  No pain with range of motion.  She indicates that to be the side of the proximal right middle finger.      ROS: See HPI otherwise normal.    No Known Allergies   Current Outpatient Medications   Medication Sig Dispense Refill    amoxicillin-clavulanate (AUGMENTIN) 875-125 MG tablet Take 1 tablet by mouth 2 times daily for 7 days 14 tablet 0    buPROPion (WELLBUTRIN XL) 300 MG 24 hr tablet TK 1 T PO D UTD      clindamycin (CLEOCIN T) 1 % external lotion Apply topically 2 times daily 60 mL 1    spironolactone (ALDACTONE) 50 MG tablet Take 1 tablet (50 mg) by mouth daily 90 tablet 1    tretinoin (RETIN-A) 0.025 % external cream Use every night 45 g 1         PE: No acute distress on phone appointment.  Unable to see finger for evaluation.        TREATMENT: None.      ASSESSMENT: Dog bite right middle finger.  I instructed patient to be seen today.  She is unable to do so due to being the mother of 3 small children.  I will order antibiotics to get treatment started but instructed her to be seen in 24 hours of any questions not starting to get better.      DIAGNOSIS: Dog bite right middle finger.      PLAN: Augmentin as instructed.  Recheck 24 hours if no better.

## 2024-04-04 NOTE — TELEPHONE ENCOUNTER
PA Initiation    Medication: TRETINOIN 0.025 % EX CREA  Insurance Company: Other (see comments)  Pharmacy Filling the Rx: [id number] [place], [place] - [address] AT [facility] [address]  Filling Pharmacy Phone: [phone]  Filling Pharmacy Fax: [phone]  Start Date: 4/4/2024

## 2024-04-05 NOTE — TELEPHONE ENCOUNTER
Prior Authorization Approval    Medication: TRETINOIN 0.025 % EX CREA  Authorization Effective Date: 4/4/2024  Authorization Expiration Date: 4/3/2025  Approved Dose/Quantity:   Reference #:     Insurance Company: Other (see comments)  Expected CoPay: $    CoPay Card Available:      Financial Assistance Needed:   Which Pharmacy is filling the prescription: [id number] [place], [place] - [address] AT [facility] & [facility]  Pharmacy Notified: YES  Patient Notified: **Instructed pharmacy to notify patient when script is ready to /ship.**

## 2024-04-16 DIAGNOSIS — M25.561 RIGHT KNEE PAIN: Primary | ICD-10-CM

## 2024-04-18 ENCOUNTER — OFFICE VISIT (OUTPATIENT)
Dept: ORTHOPEDICS | Facility: CLINIC | Age: 34
End: 2024-04-18
Payer: COMMERCIAL

## 2024-04-18 ENCOUNTER — ANCILLARY PROCEDURE (OUTPATIENT)
Dept: GENERAL RADIOLOGY | Facility: CLINIC | Age: 34
End: 2024-04-18
Attending: PREVENTIVE MEDICINE
Payer: COMMERCIAL

## 2024-04-18 DIAGNOSIS — M25.561 RIGHT KNEE PAIN: ICD-10-CM

## 2024-04-18 DIAGNOSIS — M54.16 LUMBAR RADICULAR PAIN: Primary | ICD-10-CM

## 2024-04-18 DIAGNOSIS — M54.16 LUMBAR RADICULAR PAIN: ICD-10-CM

## 2024-04-18 DIAGNOSIS — M25.561 CHRONIC PAIN OF RIGHT KNEE: ICD-10-CM

## 2024-04-18 DIAGNOSIS — G89.29 CHRONIC PAIN OF RIGHT KNEE: ICD-10-CM

## 2024-04-18 DIAGNOSIS — M17.11 ARTHRITIS OF RIGHT KNEE: ICD-10-CM

## 2024-04-18 PROCEDURE — 73562 X-RAY EXAM OF KNEE 3: CPT | Mod: RT | Performed by: RADIOLOGY

## 2024-04-18 PROCEDURE — 20610 DRAIN/INJ JOINT/BURSA W/O US: CPT | Mod: RT | Performed by: PREVENTIVE MEDICINE

## 2024-04-18 PROCEDURE — 99214 OFFICE O/P EST MOD 30 MIN: CPT | Mod: 25 | Performed by: PREVENTIVE MEDICINE

## 2024-04-18 PROCEDURE — 72100 X-RAY EXAM L-S SPINE 2/3 VWS: CPT | Performed by: RADIOLOGY

## 2024-04-18 RX ORDER — LIDOCAINE HYDROCHLORIDE 10 MG/ML
4 INJECTION, SOLUTION INFILTRATION; PERINEURAL
Status: SHIPPED | OUTPATIENT
Start: 2024-04-18

## 2024-04-18 RX ORDER — MELOXICAM 15 MG/1
15 TABLET ORAL DAILY
Qty: 30 TABLET | Refills: 0 | Status: SHIPPED | OUTPATIENT
Start: 2024-04-18 | End: 2024-06-24

## 2024-04-18 RX ORDER — TRIAMCINOLONE ACETONIDE 40 MG/ML
40 INJECTION, SUSPENSION INTRA-ARTICULAR; INTRAMUSCULAR
Status: SHIPPED | OUTPATIENT
Start: 2024-04-18

## 2024-04-18 RX ADMIN — LIDOCAINE HYDROCHLORIDE 4 ML: 10 INJECTION, SOLUTION INFILTRATION; PERINEURAL at 11:24

## 2024-04-18 RX ADMIN — TRIAMCINOLONE ACETONIDE 40 MG: 40 INJECTION, SUSPENSION INTRA-ARTICULAR; INTRAMUSCULAR at 11:24

## 2024-04-18 NOTE — PROGRESS NOTES
HISTORY OF PRESENT ILLNESS  Ms. Paulino is a pleasant 34 year old year old female who presents to clinic today with the following:    What problem are you here for: Evaluation for her knee pain, she describes popping over patella and will have intermittent bruising without acute contact. She is experiencing instability when she stands from a squat position.   She has had on/off low back pain as well that radiates into right hip and leg at times  She has a one year old son.    How long have you had this problem: started in 2018, chronic     Have you had any recent imaging of this problem? Xrays/MRI/CT scans:  - XR of right knee completed at appointment.     Have you had treatments for this problem in the past:   -Medications: she will use ibuprofen daily.   -Physical therapy: None   -Injections: None  -Surgery: None   - Rest and limitation on running  - applies ice packs  - knee compression sleeve    How severe is this problem today? 0-10 scale: 7/10    How severe has this problem been at WORST in the past? 0-10 scale: high impact activities such as running.     What do you think caused this problem: possible overuse or degeneration.     Does this problem or its symptoms cause difficulty for you falling asleep or staying asleep: No     Anything else you want us to know about this problem:  She is a , she has three kids (14 year old girl, 6  year old boy and 1 year old Calderon).     MEDICAL HISTORY  Patient Active Problem List   Diagnosis    Episodic mood disorder (H24)    Episode of recurrent major depressive disorder, unspecified depression episode severity (H24)    RIK (generalized anxiety disorder)    History of bulimia nervosa    Polysubstance overdose    Celiac disease       Current Outpatient Medications   Medication Sig Dispense Refill    buPROPion (WELLBUTRIN XL) 300 MG 24 hr tablet TK 1 T PO D UTD      clindamycin (CLEOCIN T) 1 % external lotion Apply topically 2 times daily 60 mL 1     spironolactone (ALDACTONE) 50 MG tablet Take 1 tablet (50 mg) by mouth daily 90 tablet 1    tretinoin (RETIN-A) 0.025 % external cream Use every night 45 g 1       No Known Allergies    Family History   Problem Relation Age of Onset    No Known Problems Mother     No Known Problems Father     No Known Problems Brother     Skin Cancer Brother     Alzheimer Disease Paternal Grandmother     No Known Problems Son      Social History     Socioeconomic History    Marital status: Single   Tobacco Use    Smoking status: Former    Smokeless tobacco: Never   Substance and Sexual Activity    Alcohol use: Never    Drug use: Never    Sexual activity: Not Currently     Partners: Male     Birth control/protection: Pill       Additional medical/Social/Surgical histories reviewed in [facility] and updated as appropriate.     REVIEW OF SYSTEMS (4/18/2024)  10 point ROS of systems including Constitutional, Eyes, Respiratory, Cardiovascular, Gastroenterology, Genitourinary, Integumentary, Musculoskeletal, Psychiatric, Allergic/Immunologic were all negative except for pertinent positives noted in my HPI.     PHYSICAL EXAM  VSS    General  - normal appearance, in no obvious distress  HEENT  - conjunctivae not injected, moist mucous membranes, normocephalic/atraumatic head, ears normal appearance, no lesions, mouth normal appearance, no scars, normal dentition and teeth present  CV  - normal popliteal pulse  Pulm  - normal respiratory pattern, non-labored  Musculoskeletal - right knee  - stance: mildly antalgic gait, genu varum  - inspection: generalized swelling, trace effusion  - palpation: medial joint line tenderness, patella and patellar tendon non-tender, normal popliteal pulse  - ROM: 100 degrees flexion, 0 degrees extension, painful active ROM  - strength: 5/5 in flexion, 5/5 in extension  - neuro: no sensory or motor deficit  - special tests:  (-) Lachman  (-) anterior drawer  (-) posterior drawer  (-) pivot shift  (-) Feng  (-)  Thessaly  (-) varus at 0 and 30 degrees flexion  (-) valgus at 0 and 30 degrees flexion  (+) Kelton s compression test  (-) patellar apprehension  Neuro  - no sensory or motor deficit, grossly normal coordination, normal muscle tone  Skin  - no ecchymosis, erythema, warmth, or induration, no obvious rash  Psych  - interactive, appropriate, normal mood and affect  Lumbar: has mild discomfort in right knee with extension, normal ROM no pain and negative SLR today    ASSESSMENT & PLAN  33 yo female with right knee arthritis, patellofemoral arthritis, and lumbar ddd, radiculopathy    I independently reviewed the following imaging studies:  Lumbar xrays: shows ddd  Right knee xray: shows some PF arthritis  After a 20 minute discussion and examination, we decided to perform a same day injection for diagnostic and therapeutic purposes for  Right knee  Can consider HA injections  Followup in 1 month consider HA injections if not improved and consider further workup for lumbar spine if radicular symptoms persist  Given RX for mobic PRN  Given HEP for low back and knee and can consider PT  Patient has been doing home exercise physical therapy program for this problem      Appropriate PPE was utilized for prevention of spread of Covid-19.  Mekhi Mina MD, CAQSM    PROCEDURE:      right      Knee joint injection   The patient was apprised of the risks and the benefits of the procedure and consented and a written consent was signed.   The patient s  KNEE was sterilely prepped with chloraprep.   40 mg of triamcinolone suspension was drawn up into a 5 mL syringe with 4 mL of 1% lidocaine  The patient was injected into the knee with a 1.5-inch 22-gauge needle at the_superiorlateral aspect of their knee joint  There were no complications. The patient tolerated the procedure well. There was minimal bleeding.   The patient was instructed to ice the knee upon leaving clinic and refrain from overuse over the next 3 days.   The patient  was instructed to go to the emergency room with any usual pain, swelling, or redness occurred in the injected area.   The patient was given a followup appointment to evaluate response to the injection to their increased range of motion and reduction of pain.  Follow up PRN  Dr Mekhi Mina     Large Joint Injection: R knee joint    Date/Time: 4/18/2024 11:24 AM    Performed by: Mekhi Mina MD  Authorized by: Mekhi Mina MD    Indications:  Pain and osteoarthritis  Needle Size:  22 G  Guidance: landmark guided    Approach:  Superolateral  Location:  Knee      Medications:  40 mg triamcinolone 40 MG/ML; 4 mL lidocaine 1 %  Outcome:  Tolerated well, no immediate complications  Procedure discussed: discussed risks, benefits, and alternatives    Consent Given by:  Patient  Timeout: timeout called immediately prior to procedure    Prep: patient was prepped and draped in usual sterile fashion

## 2024-04-18 NOTE — NURSING NOTE
[address] [address]  Dept: [phone]  ______________________________________________________________________________    Patient: Vibha Paulino   : 1990   MRN: [id number]   2024    INVASIVE PROCEDURE SAFETY CHECKLIST    Date: 2024   Procedure: right knee joint injection with kenalog   Patient Name: Vibha Paulino  MRN: [id number]  YOB: 1990    Action: Complete sections as appropriate. Any discrepancy results in a HARD COPY until resolved.     PRE PROCEDURE:  Patient ID verified with 2 identifiers (name and  or MRN): Yes  Procedure and site verified with patient/designee (when able): Yes  Accurate consent documentation in medical record: Yes  H&P (or appropriate assessment) documented in medical record: Yes  H&P must be up to 20 days prior to procedure and updates within 24 hours of procedure as applicable: NA  Relevant diagnostic and radiology test results appropriately labeled and displayed as applicable: NA  Procedure site(s) marked with provider initials: NA    TIMEOUT:  Time-Out performed immediately prior to starting procedure, including verbal and active participation of all team members addressing the following:Yes  * Correct patient identify  * Confirmed that the correct side and site are marked  * An accurate procedure consent form  * Agreement on the procedure to be done  * Correct patient position  * Relevant images and results are properly labeled and appropriately displayed  * The need to administer antibiotics or fluids for irrigation purposes during the procedure as applicable   * Safety precautions based on patient history or medication use    DURING PROCEDURE: Verification of correct person, site, and procedures any time the responsibility for care of the patient is transferred to another member of the care team.       Prior to injection, verified patient identity using patient's  name and date of birth.  Due to injection administration, patient instructed to remain in clinic for 15 minutes  afterwards, and to report any adverse reaction to me immediately.    Joint injection was performed.      Kenalog   Drug Amount Wasted:  None.  Vial/Syringe: Single dose vial  Expiration Date:  12/01/2025    Halle Nickerson, ATC  April 18, 2024

## 2024-04-18 NOTE — PATIENT INSTRUCTIONS
Low Back Pain Exercises    EXERCISES  These exercises are intended only as suggestions. Be sure to check with your provider before starting the exercises.    Standing hamstring stretch: Put the heel of one leg on a stool about 15 inches high. Keep your leg straight. Lean forward, bending at the hips until you feel a mild stretch in the back of your thigh. Make sure you do not roll your shoulders or bend at the waist when doing this. You want to stretch your leg, not your lower back. Hold the stretch for 15 to 30 seconds. Repeat with each leg 3 times.    Cat and camel: Get down on your hands and knees. Let your stomach sag, allowing your back to curve downward. Hold this position for 5 seconds. Then arch your back and hold for 5 seconds. Do 2 sets of 15.    Quadruped arm and leg raise: Get down on your hands and knees. Pull in your belly button and tighten your abdominal muscles to stiffen your spine. While keeping your abdominals tight, raise one arm and the opposite leg away from you. Hold this position for 5 seconds. Lower your arm and leg slowly and change sides. Do this 10 times on each side.    Pelvic tilt: Lie on your back with your knees bent and your feet flat on the floor. Pull your belly button in towards your spine and push your lower back into the floor, flattening your back. Hold this position for 15 seconds, then relax. Repeat 5 to 10 times.    Partial curl: Lie on your back with your knees bent and your feet flat on the floor. Draw in your abdomen and tighten your stomach muscles. With your hands stretched out in front of you, curl your upper body forward until your shoulders clear the floor. Hold this position for 3 seconds. Don't hold your breath. It helps to breathe out as you lift your shoulders. Relax back to the floor. Repeat 10 times. Build to 2 sets of 15. To challenge yourself, clasp your hands behind your head and keep your elbows out to your sides.    Gluteal stretch: Lie on your back with  both knees bent. Rest your right ankle over the knee of your left leg. Grasp the thigh of the left leg and pull toward your chest. You will feel a stretch along the buttocks and possibly along the outside of your hip. Hold the stretch for 15 to 30 seconds. Then repeat the exercise with your left ankle over your right knee. Do the exercise 3 times with each leg.    Extension exercise  Lie face down on the floor for 5 minutes. If this hurts too much, lie face down with a pillow under your stomach. This should relieve your leg or back pain. When you can lie on your stomach for 5 minutes without a pillow, you can continue with Part B of this exercise.    After lying on your stomach for 5 minutes, prop yourself up on your elbows for another 5 minutes. If you can do this without having more leg or buttock pain, you can start doing part C of this exercise.    Lie on your stomach with your hands under your shoulders. Then press down on your hands and extend your elbows while keeping your hips flat on the floor. Hold for 1 second and lower yourself to the floor. Do 3 to 5 sets of 10 repetitions. Rest for 1 minute between sets. You should have no pain in your legs when you do this, but it is normal to feel some pain in your lower back.    Do this exercise several times a day.    Side plank: Lie on your side with your legs, hips, and shoulders in a straight line. Prop yourself up onto your forearm with your elbow directly under your shoulder. Lift your hips off the floor and balance on your forearm and the outside of your foot. Try to hold this position for 15 seconds and then slowly lower your hip to the ground. Switch sides and repeat. Work up to holding for 1 minute. This exercise can be made easier by starting with your knees and hips flexed toward your chest.    EXERCISES TO AVOID     It s best to avoid the following exercises because they strain the lower back:  Exercises in which you lie on your back and raise and lower  both legs together  Full sit-ups or sit-ups with straight legs  Hip twists    SPORTS AND OTHER ACTIVITIES    In addition to strengthening your back muscles, it s helpful to keep your entire body in shape. Good activities for people with back problems include:    Walking  Bicycling  Swimming  Cross-country skiing  Yoga  Huy Chi  Pilates    Some sports can hurt your back because of rough contact, twisting, sudden impact, or direct stress on your back. Sports that may be dangerous to your back include:    Football  Soccer  Volleyball  Handball  Golf  Weight lifting  Trampoline  Tobogganing  Sledding  Snowmobiling  Snowboarding  Ice hockey       Chondromalacia / Patellofemoral Pain    CHONDROMALACIA PATELLAE:  -Pain in the front of your knee due to increased pressure from the knee cap (patella)  -There are many causes including trauma, history of dislocation or subluxation of knee cap but most often it is due to an imbalance of the thigh muscles or weak core muscles which cause irregular tracking of your kneecap on your thigh bone.  -People whose feet pronate (roll in) increase the outward pulling on the kneecap as well    SIGNS AND SYMPTOMS:  -Diffuse knee pain that worsens with stairs, squatting, prolonged sitting, jumping, and similar movements.  -Pain may be achy or sharp  -Stiffness with prolonged sitting  -Occasionally, giving way of the knee, grinding or a catching sensation    TREATMENT:  -regular exercise (biking, swimming, or elliptical are good for cardio)  -weight lifting/plyometrics are helpful but remember to keep your knees behind your toes (don't bend knee more than 90degrees)  -regular core exercises (yoga and pilates)  -arch supports may help during exercise until hips stronger to prevent ankle pronation  *over the counter semi-rigid brands include power step arch supports may be purchased at [employer], [employer] or [email or web address]  *custom made orthotics may be ordered by your  physician if needed for prolonged treatment  -Stretching and strengthening therapy  -Ice 10-15 minutes after activity. (Ice cups for massage 5-7min)  -Ice and NSAIDs help decrease inflammation. A good anti-inflammatory NSAID medication is Alleve (220mg). Take 1-2 tabs twice daily with food until pain improves or minimum of 14 days, then as needed for pain. (1-2 tabs twice daily dosing is for patients over 12 years old. Stephanie than 11 yo, check dose with doctor.)  -often component of hip weakness that leads to lower extremity (foot, ankle, shin, and knee) problems so a lot of focus will be on core strength and balance  - recommend yoga for core strengthening and stretching  -Perform exercises as instructed through handout or formal therapy if doing. Until then start with the following:  -Ankle strengthening  1) balance on one foot 1-2 min daily  2) once able to balance for 1 minute, start hip strengthening with 4 way motion with straight leg. Tie theraband around ankle and balance on other foot while doing15 reps each direction of straight leg  motion  3) arch raises- tighten bottom of foot and hold x 3-5 sec, repeat 5 times  4) ankle exercises (4 way with theraband)- 3 sets of 10-15 (fatigue) daily  -usually takes several weeks before pain free but longer before return to full activity without pain  --Once released to increase activity, BE PATIENT!    Return to activity guidelines include:  If it hurts, please avoid activity  Start gradually and build up, wait 24 hours before increase intensity and time  Runnin min on treadmill (or somewhere you can get home easily from if you have to stop), start walk 4 min/run 1 min and Repeat 3 times. If pain free for 48 hours, increase to walk 3 min/run 2 min. Continue to increase as such as pain allows. If you develop pain, back off to previous pain-free level and wait 1 week before increasing again.  Follow-up in 6 weeks if not improved or sooner if further concern.  If  problem flares again after resolved, restart icing, and exercises.      Standing hamstring stretch: Put the heel of the leg on your injured side on a stool about 15 inches high. Keep your leg straight. Lean forward, bending at the hips, until you feel a mild stretch in the back of your thigh. Make sure you don't roll your shoulders or bend at the waist when doing this or you will stretch your lower back instead of your leg. Hold the stretch for 15 to 30 seconds. Repeat 3 times.    Quadriceps stretch: Stand at an arm's length away from the wall with your injured side farthest from the wall. Facing straight ahead, brace yourself by keeping one hand against the wall. With your other hand, grasp the ankle on your injured side and pull your heel toward your buttocks. Don't arch or twist your back. Keep your knees together. Hold this stretch for 15 to 30 seconds.    Side-lying leg lift: Lie on your uninjured side. Tighten the front thigh muscles on your injured leg and lift that leg 8 to 10 inches (20 to 25 centimeters) away from the other leg. Keep the leg straight and lower it slowly. Do 2 sets of 15.    Quad sets: Sit on the floor with your injured leg straight and your other leg bent. Press the back of the knee of your injured leg against the floor by tightening the muscles on the top of your thigh. Hold this position 10 seconds. Relax. Do 2 sets of 15.    Straight leg raise: Lie on your back with your legs straight out in front of you. Bend the knee on your uninjured side and place the foot flat on the floor. Tighten the thigh muscle on your injured side and lift your leg about 8 inches off the floor. Keep your leg straight and your thigh muscle tight. Slowly lower your leg back down to the floor. Do 2 sets of 15.    Clam exercise: Lie on your uninjured side with your hips and knees bent and feet together. Slowly raise your top leg toward the ceiling while keeping your heels touching each other. Hold for 2 seconds  and lower slowly. Do 2 sets of 15 repetitions.    Wall squat with a ball: Stand with your back, shoulders, and head against a wall. Look straight ahead. Keep your shoulders relaxed and your feet 3 feet (90 centimeters) from the wall and shoulder's width apart. Place a soccer or basketball-sized ball behind your back. Keeping your back against the wall, slowly squat down to a 45-degree angle. Your thighs will not yet be parallel to the floor. Hold this position for 10 seconds and then slowly slide back up the wall. Repeat 10 times. Build up to 2 sets of 15.    Knee stabilization: Wrap a piece of elastic tubing around the ankle of your uninjured leg. Tie a knot in the other end of the tubing and close it in a door at about ankle height.  Stand facing the door on the leg without tubing (your injured leg) and bend your knee slightly, keeping your thigh muscles tight. Stay in this position while you move the leg with the tubing (the uninjured leg) straight back behind you. Do 2 sets of 15.  Turn 90 degrees so the leg without tubing is closest to the door. Move the leg with tubing away from your body. Do 2 sets of 15.  Turn 90 degrees again so your back is to the door. Move the leg with tubing straight out in front of you. Do 2 sets of 15.  Turn your body 90 degrees again so the leg with tubing is closest to the door. Move the leg with tubing across your body. Do 2 sets of 15.  Hold onto a chair if you need help balancing. This exercise can be made more challenging by standing on a firm pillow or foam mat while you move the leg with tubing.    Resisted terminal knee extension: Make a loop with a piece of elastic tubing by tying a knot in both ends. Close the knot in a door at knee height. Step into the loop with your injured leg so the tubing is around the back of your knee. Lift the other foot off the ground and hold onto a chair for balance, if needed. Bend the knee with tubing about 45 degrees. Slowly straighten your  leg, keeping your thigh muscle tight as you do this. Repeat 15 times. Do 2 sets of 15. If you need an easier way to do this, stand on both legs for better support while you do the exercise.    Standing calf stretch: Stand facing a wall with your hands on the wall at about eye level. Keep your injured leg back with your heel on the floor. Keep the other leg forward with the knee bent. Turn your back foot slightly inward (as if you were pigeon-toed). Slowly lean into the wall until you feel a stretch in the back of your calf. Hold the stretch for 15 to 30 seconds. Return to the starting position. Repeat 3 times. Do this exercise several times each day.    Step-up: Stand with the foot of your injured leg on a support 3 to 5 inches (8 to 13 centimeters) high --like a small step or block of wood. Keep your other foot flat on the floor. Shift your weight onto the injured leg on the support. Straighten your injured leg as the other leg comes off the floor. Return to the starting position by bending your injured leg and slowly lowering your uninjured leg back to the floor. Do 2 sets of 15.    Iliotibial band stretch, side-bending: Cross one leg in front of the other leg and lean in the opposite direction from the front leg. Reach the arm on the side of the back leg over your head while you do this. Hold this position for 15 to 30 seconds. Return to the starting position. Repeat 3 times and then switch legs and repeat the exercise.    Developed by [employer].  Published by [employer].  Copyright  2014 [employer] and/or one of its subsidiaries. All rights reserved.

## 2024-04-18 NOTE — LETTER
4/18/2024         RE: Vibha Paulino  [address]  [place] [address]        Dear Colleague,    Thank you for referring your patient, Vibha Paulino, to the [facility] [place]. Please see a copy of my visit note below.    HISTORY OF PRESENT ILLNESS  Ms. Paulino is a pleasant 34 year old year old female who presents to clinic today with the following:    What problem are you here for: Evaluation for her knee pain, she describes popping over patella and will have intermittent bruising without acute contact. She is experiencing instability when she stands from a squat position.   She has had on/off low back pain as well that radiates into right hip and leg at times  She has a one year old son.    How long have you had this problem: started in 2018, chronic     Have you had any recent imaging of this problem? Xrays/MRI/CT scans:  - XR of right knee completed at appointment.     Have you had treatments for this problem in the past:   -Medications: she will use ibuprofen daily.   -Physical therapy: None   -Injections: None  -Surgery: None   - Rest and limitation on running  - applies ice packs  - knee compression sleeve    How severe is this problem today? 0-10 scale: 7/10    How severe has this problem been at WORST in the past? 0-10 scale: high impact activities such as running.     What do you think caused this problem: possible overuse or degeneration.     Does this problem or its symptoms cause difficulty for you falling asleep or staying asleep: No     Anything else you want us to know about this problem:  She is a , she has three kids (14 year old girl, 6  year old boy and 1 year old Calderon).     MEDICAL HISTORY  Patient Active Problem List   Diagnosis     Episodic mood disorder (H24)     Episode of recurrent major depressive disorder, unspecified depression episode severity (H24)     RIK (generalized anxiety disorder)     History of bulimia  nervosa     Polysubstance overdose     Celiac disease       Current Outpatient Medications   Medication Sig Dispense Refill     buPROPion (WELLBUTRIN XL) 300 MG 24 hr tablet TK 1 T PO D UTD       clindamycin (CLEOCIN T) 1 % external lotion Apply topically 2 times daily 60 mL 1     spironolactone (ALDACTONE) 50 MG tablet Take 1 tablet (50 mg) by mouth daily 90 tablet 1     tretinoin (RETIN-A) 0.025 % external cream Use every night 45 g 1       No Known Allergies    Family History   Problem Relation Age of Onset     No Known Problems Mother      No Known Problems Father      No Known Problems Brother      Skin Cancer Brother      Alzheimer Disease Paternal Grandmother      No Known Problems Son      Social History     Socioeconomic History     Marital status: Single   Tobacco Use     Smoking status: Former     Smokeless tobacco: Never   Substance and Sexual Activity     Alcohol use: Never     Drug use: Never     Sexual activity: Not Currently     Partners: Male     Birth control/protection: Pill       Additional medical/Social/Surgical histories reviewed in [facility] and updated as appropriate.     REVIEW OF SYSTEMS (4/18/2024)  10 point ROS of systems including Constitutional, Eyes, Respiratory, Cardiovascular, Gastroenterology, Genitourinary, Integumentary, Musculoskeletal, Psychiatric, Allergic/Immunologic were all negative except for pertinent positives noted in my HPI.     PHYSICAL EXAM  VSS    General  - normal appearance, in no obvious distress  HEENT  - conjunctivae not injected, moist mucous membranes, normocephalic/atraumatic head, ears normal appearance, no lesions, mouth normal appearance, no scars, normal dentition and teeth present  CV  - normal popliteal pulse  Pulm  - normal respiratory pattern, non-labored  Musculoskeletal - right knee  - stance: mildly antalgic gait, genu varum  - inspection: generalized swelling, trace effusion  - palpation: medial joint line tenderness, patella and patellar tendon  non-tender, normal popliteal pulse  - ROM: 100 degrees flexion, 0 degrees extension, painful active ROM  - strength: 5/5 in flexion, 5/5 in extension  - neuro: no sensory or motor deficit  - special tests:  (-) Lachman  (-) anterior drawer  (-) posterior drawer  (-) pivot shift  (-) Feng  (-) Thessaly  (-) varus at 0 and 30 degrees flexion  (-) valgus at 0 and 30 degrees flexion  (+) Kelton s compression test  (-) patellar apprehension  Neuro  - no sensory or motor deficit, grossly normal coordination, normal muscle tone  Skin  - no ecchymosis, erythema, warmth, or induration, no obvious rash  Psych  - interactive, appropriate, normal mood and affect  Lumbar: has mild discomfort in right knee with extension, normal ROM no pain and negative SLR today    ASSESSMENT & PLAN  33 yo female with right knee arthritis, patellofemoral arthritis, and lumbar ddd, radiculopathy    I independently reviewed the following imaging studies:  Lumbar xrays: shows ddd  Right knee xray: shows some PF arthritis  After a 20 minute discussion and examination, we decided to perform a same day injection for diagnostic and therapeutic purposes for  Right knee  Can consider HA injections  Followup in 1 month consider HA injections if not improved and consider further workup for lumbar spine if radicular symptoms persist  Given RX for mobic PRN  Given HEP for low back and knee and can consider PT  Patient has been doing home exercise physical therapy program for this problem      Appropriate PPE was utilized for prevention of spread of Covid-19.  Mekhi Mina MD, CAQSM    PROCEDURE:      right      Knee joint injection   The patient was apprised of the risks and the benefits of the procedure and consented and a written consent was signed.   The patient s  KNEE was sterilely prepped with chloraprep.   40 mg of triamcinolone suspension was drawn up into a 5 mL syringe with 4 mL of 1% lidocaine  The patient was injected into the knee with a  1.5-inch 22-gauge needle at the_superiorlateral aspect of their knee joint  There were no complications. The patient tolerated the procedure well. There was minimal bleeding.   The patient was instructed to ice the knee upon leaving clinic and refrain from overuse over the next 3 days.   The patient was instructed to go to the emergency room with any usual pain, swelling, or redness occurred in the injected area.   The patient was given a followup appointment to evaluate response to the injection to their increased range of motion and reduction of pain.  Follow up PRN  Dr Mekhi Mina     Large Joint Injection: R knee joint    Date/Time: 4/18/2024 11:24 AM    Performed by: Mekhi Mina MD  Authorized by: Mekhi Mina MD    Indications:  Pain and osteoarthritis  Needle Size:  22 G  Guidance: landmark guided    Approach:  Superolateral  Location:  Knee      Medications:  40 mg triamcinolone 40 MG/ML; 4 mL lidocaine 1 %  Outcome:  Tolerated well, no immediate complications  Procedure discussed: discussed risks, benefits, and alternatives    Consent Given by:  Patient  Timeout: timeout called immediately prior to procedure    Prep: patient was prepped and draped in usual sterile fashion          Again, thank you for allowing me to participate in the care of your patient.        Sincerely,        Mekhi Mina MD

## 2024-05-13 ENCOUNTER — TELEPHONE (OUTPATIENT)
Dept: ORTHOPEDICS | Facility: CLINIC | Age: 34
End: 2024-05-13

## 2024-05-13 ENCOUNTER — VIRTUAL VISIT (OUTPATIENT)
Dept: ORTHOPEDICS | Facility: CLINIC | Age: 34
End: 2024-05-13
Payer: COMMERCIAL

## 2024-05-13 DIAGNOSIS — M51.16 LUMBAR DISC HERNIATION WITH RADICULOPATHY: ICD-10-CM

## 2024-05-13 DIAGNOSIS — M17.11 ARTHRITIS OF RIGHT KNEE: ICD-10-CM

## 2024-05-13 DIAGNOSIS — M54.10 RADICULAR PAIN OF RIGHT LOWER EXTREMITY: ICD-10-CM

## 2024-05-13 DIAGNOSIS — M54.16 LUMBAR RADICULAR PAIN: Primary | ICD-10-CM

## 2024-05-13 PROCEDURE — 99214 OFFICE O/P EST MOD 30 MIN: CPT | Mod: 93 | Performed by: PREVENTIVE MEDICINE

## 2024-05-13 RX ORDER — METHYLPREDNISOLONE 4 MG
TABLET, DOSE PACK ORAL
Qty: 21 TABLET | Refills: 0 | Status: SHIPPED | OUTPATIENT
Start: 2024-05-13

## 2024-05-13 RX ORDER — CYCLOBENZAPRINE HCL 10 MG
5-10 TABLET ORAL
Qty: 15 TABLET | Refills: 0 | Status: SHIPPED | OUTPATIENT
Start: 2024-05-13

## 2024-05-13 NOTE — PROGRESS NOTES
Patient is a    34 year old who is being evaluated via a billable telephone visit.      What phone number would you like to be contacted at? CELL  How would you like to obtain your AVS? YUDI        Subjective   Patient is a  34   year old who presents by phone call visit for the following:     HPI   Followup for right knee injection and right leg radiculopathy  Not resolved  Right knee feels better   But continues to have right low back pain and radiating pain down right leg and hip and knee daily  Doing HEP and PT and using medications as written    Review of Systems   Constitutional, HEENT, cardiovascular, pulmonary, gi and gu systems are negative, except as otherwise noted.      Objective           Vitals:  No vitals were obtained today due to virtual visit.    Physical Exam   healthy, alert, and no distress  PSYCH: Alert and oriented times 3; coherent speech, normal   rate and volume, able to articulate logical thoughts, able   to abstract reason, no tangential thoughts, no hallucinations   or delusions  His affect is normal  RESP: No cough, no audible wheezing, able to talk in full sentences  Remainder of exam unable to be completed due to telephone visits    Assessment/Plan  33 yo female with right knee arthritis, right patellofemoral pain, lumbar disc herniations, ddd, radiculopathy, not resolved    I independently reviewed the following imaging studies and discussed with patient:  Lumbar xrays: ddd  Discussed and ordered lumbar MRI  For further evaluation  Rx given for medrol and flexeril  Cont. HEP and consider more physical therapy   Followup with me after lumbar MRI            Phone call duration: 20 minutes  Phone call start: 940am  Phone call end: 1000am  Dr Mina

## 2024-05-13 NOTE — LETTER
5/13/2024         RE: Vibha Paulino  [address]  [facility] [address]        Dear Colleague,    Thank you for referring your patient, Vibha Paulino, to the [facility] [place]. Please see a copy of my visit note below.    Patient is a    34 year old who is being evaluated via a billable telephone visit.      What phone number would you like to be contacted at? CELL  How would you like to obtain your AVS? MYCHART        Subjective   Patient is a  34   year old who presents by phone call visit for the following:     HPI   Followup for right knee injection and right leg radiculopathy  Not resolved  Right knee feels better   But continues to have right low back pain and radiating pain down right leg and hip and knee daily  Doing HEP and PT and using medications as written    Review of Systems   Constitutional, HEENT, cardiovascular, pulmonary, gi and gu systems are negative, except as otherwise noted.      Objective           Vitals:  No vitals were obtained today due to virtual visit.    Physical Exam   healthy, alert, and no distress  PSYCH: Alert and oriented times 3; coherent speech, normal   rate and volume, able to articulate logical thoughts, able   to abstract reason, no tangential thoughts, no hallucinations   or delusions  His affect is normal  RESP: No cough, no audible wheezing, able to talk in full sentences  Remainder of exam unable to be completed due to telephone visits    Assessment/Plan  33 yo female with right knee arthritis, right patellofemoral pain, lumbar disc herniations, ddd, radiculopathy, not resolved    I independently reviewed the following imaging studies and discussed with patient:  Lumbar xrays: ddd  Discussed and ordered lumbar MRI  For further evaluation  Rx given for medrol and flexeril  Cont. HEP and consider more physical therapy   Followup with me after lumbar MRI            Phone call duration: 20 minutes  Phone call  start: 940am  Phone call end: 1000am  Dr Mina      Again, thank you for allowing me to participate in the care of your patient.        Sincerely,        Mekhi Mina MD

## 2024-05-13 NOTE — LETTER
5/13/2024         RE: Vibha Paulino  [address]  [facility] [address]        Dear Colleague,    Thank you for referring your patient, Vibha Paulino, to the [facility] [place]. Please see a copy of my visit note below.    Patient is a    34 year old who is being evaluated via a billable telephone visit.      What phone number would you like to be contacted at? CELL  How would you like to obtain your AVS? MYCHART        Subjective   Patient is a  34   year old who presents by phone call visit for the following:     HPI   Followup for right knee injection and right leg radiculopathy  Not resolved  Right knee feels better   But continues to have right low back pain and radiating pain down right leg and hip and knee daily  Doing HEP and PT and using medications as written    Review of Systems   Constitutional, HEENT, cardiovascular, pulmonary, gi and gu systems are negative, except as otherwise noted.      Objective           Vitals:  No vitals were obtained today due to virtual visit.    Physical Exam   healthy, alert, and no distress  PSYCH: Alert and oriented times 3; coherent speech, normal   rate and volume, able to articulate logical thoughts, able   to abstract reason, no tangential thoughts, no hallucinations   or delusions  His affect is normal  RESP: No cough, no audible wheezing, able to talk in full sentences  Remainder of exam unable to be completed due to telephone visits    Assessment/Plan  33 yo female with right knee arthritis, right patellofemoral pain, lumbar disc herniations, ddd, radiculopathy, not resolved    I independently reviewed the following imaging studies and discussed with patient:  Lumbar xrays: ddd  Discussed and ordered lumbar MRI  For further evaluation  Rx given for medrol and flexeril  Cont. HEP and consider more physical therapy   Followup with me after lumbar MRI            Phone call duration: 20 minutes  Phone call  start: 940am  Phone call end: 1000am  Dr Mina      Again, thank you for allowing me to participate in the care of your patient.        Sincerely,        Mekhi Mina MD

## 2024-05-13 NOTE — TELEPHONE ENCOUNTER
Left Voicemail (1st Attempt) for the patient to call back and schedule the following:    Appointment type: return  Provider: dr. cheng  Return date: couple days after mri  Specialty phone number: [phone]  Additional appointment(s) needed:  mri prior  Additonal Notes: follow up to discuss results of mri.     [facility]   Orthopedics, Podiatry, Sports Medicine, Ent ,Eye , Audiology, Adult Endocrine & Diabetes, Nutrition & Medication Therapy Management Specialties   [facility]

## 2024-06-24 DIAGNOSIS — M25.561 CHRONIC PAIN OF RIGHT KNEE: ICD-10-CM

## 2024-06-24 DIAGNOSIS — M54.16 LUMBAR RADICULAR PAIN: ICD-10-CM

## 2024-06-24 DIAGNOSIS — G89.29 CHRONIC PAIN OF RIGHT KNEE: ICD-10-CM

## 2024-06-24 NOTE — TELEPHONE ENCOUNTER
Prescription refill requested for:       meloxicam (MOBIC) 15 MG tablet   Last Written Prescription Date:  4/18/2024  Last Fill Quantity: 30,   # refills: 0  Last Office Visit: 5/13/2024  Future Office visit:  MRI not completed and no follow up scheduled.     Halle Nickerson, ATC

## 2024-06-26 ENCOUNTER — MYC MEDICAL ADVICE (OUTPATIENT)
Dept: ORTHOPEDICS | Facility: CLINIC | Age: 34
End: 2024-06-26
Payer: COMMERCIAL

## 2024-06-28 NOTE — TELEPHONE ENCOUNTER
ATC LVM for patient explaining Dr. Mina received a refill request for meloxicam from her pharmacy and upon Dr. Mina's review of her chart, we do not see that she has completed her lumbar MRI. ATC is inquiring if she had this completed outside of our system or if she needs assistance with scheduling.    ATC explained at this time, Dr. Mina will not be refilling this medication until we have a treatment plan.    ATC provided call back number of [phone].    NARCISO Neri

## 2024-07-08 RX ORDER — MELOXICAM 15 MG/1
15 TABLET ORAL DAILY
Qty: 30 TABLET | Refills: 0 | Status: SHIPPED | OUTPATIENT
Start: 2024-07-08

## 2024-09-15 DIAGNOSIS — L70.0 ACNE VULGARIS: ICD-10-CM

## 2024-09-18 DIAGNOSIS — L70.0 ACNE VULGARIS: ICD-10-CM

## 2024-09-19 ENCOUNTER — OFFICE VISIT (OUTPATIENT)
Dept: URGENT CARE | Facility: URGENT CARE | Age: 34
End: 2024-09-19
Payer: COMMERCIAL

## 2024-09-19 ENCOUNTER — NURSE TRIAGE (OUTPATIENT)
Dept: FAMILY MEDICINE | Facility: CLINIC | Age: 34
End: 2024-09-19
Payer: COMMERCIAL

## 2024-09-19 VITALS
SYSTOLIC BLOOD PRESSURE: 128 MMHG | WEIGHT: 137.5 LBS | OXYGEN SATURATION: 99 % | HEART RATE: 104 BPM | TEMPERATURE: 98.3 F | DIASTOLIC BLOOD PRESSURE: 92 MMHG | RESPIRATION RATE: 16 BRPM | BODY MASS INDEX: 21.38 KG/M2

## 2024-09-19 DIAGNOSIS — L70.0 ACNE VULGARIS: ICD-10-CM
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
## 2024-09-19 PROCEDURE — 99213 OFFICE O/P EST LOW 20 MIN: CPT | Mod: 25 | Performed by: PHYSICIAN ASSISTANT

## 2024-09-19 PROCEDURE — 96372 THER/PROPH/DIAG INJ SC/IM: CPT | Performed by: PHYSICIAN ASSISTANT

## 2024-09-19 RX ORDER — ATOMOXETINE 18 MG/1
CAPSULE ORAL
COMMUNITY
Start: 2024-09-18

## 2024-09-19 RX ORDER — CEFTRIAXONE SODIUM 1 G
1 VIAL (EA) INJECTION ONCE
Status: COMPLETED | OUTPATIENT
Start: 2024-09-19 | End: 2024-09-19

## 2024-09-19 RX ORDER — GABAPENTIN 300 MG/1
300 CAPSULE ORAL 3 TIMES DAILY
COMMUNITY
Start: 2024-09-06

## 2024-09-19 RX ORDER — NORGESTIMATE AND ETHINYL ESTRADIOL 0.25-0.035
KIT ORAL
COMMUNITY
Start: 2024-07-30

## 2024-09-19 RX ORDER — TRETINOIN 0.25 MG/G
CREAM TOPICAL
Qty: 180 G | Refills: 1 | Status: SHIPPED | OUTPATIENT
Start: 2024-09-19

## 2024-09-19 RX ORDER — SPIRONOLACTONE 50 MG/1
50 TABLET, FILM COATED ORAL DAILY
Qty: 30 TABLET | Refills: 2 | Status: SHIPPED | OUTPATIENT
Start: 2024-09-19

## 2024-09-19 RX ORDER — MIRTAZAPINE 30 MG/1
30 TABLET, FILM COATED ORAL AT BEDTIME
COMMUNITY
Start: 2024-09-06

## 2024-09-19 RX ORDER — DOXYCYCLINE HYCLATE 100 MG
100 TABLET ORAL 2 TIMES DAILY
Qty: 14 TABLET | Refills: 0 | Status: SHIPPED | OUTPATIENT
Start: 2024-09-19 | End: 2024-09-26

## 2024-09-19 RX ORDER — SPIRONOLACTONE 50 MG/1
50 TABLET, FILM COATED ORAL DAILY
Qty: 90 TABLET | Refills: 1 | OUTPATIENT
Start: 2024-09-19

## 2024-09-19 RX ADMIN — Medication 1 G: at 12:08

## 2024-09-19 ASSESSMENT — ENCOUNTER SYMPTOMS
DIARRHEA: 0
FEVER: 0
SORE THROAT: 0
PALPITATIONS: 0
WEAKNESS: 0
MUSCULOSKELETAL NEGATIVE: 1
CARDIOVASCULAR NEGATIVE: 1
ARTHRALGIAS: 0
DIZZINESS: 0
BACK PAIN: 0
WOUND: 1
ENDOCRINE NEGATIVE: 1
HEADACHES: 0
CHILLS: 0
RESPIRATORY NEGATIVE: 1
SHORTNESS OF BREATH: 0
ALLERGIC/IMMUNOLOGIC NEGATIVE: 1
NAUSEA: 0
VOMITING: 0
LIGHT-HEADEDNESS: 0
COUGH: 0
JOINT SWELLING: 0
NECK STIFFNESS: 0
NECK PAIN: 0
MYALGIAS: 0
RHINORRHEA: 0
EYES NEGATIVE: 1

## 2024-09-19 NOTE — TELEPHONE ENCOUNTER
"Reason for Disposition    Cut (length > 1/8 inch or 3 mm) or skin tear and any animal  (Exception: Superficial scratch that doesn't go through dermis.)    Additional Information    Negative: Major bleeding (actively dripping or spurting) that can't be stopped    Negative: Sounds like a life-threatening emergency to the triager    Negative: Any break in skin from BITE (e.g., cut, puncture, or scratch) and WILD animal at risk for RABIES (e.g., bat, raccoon, alvarez, skunk, coyote, other carnivores; see Background for list)    Negative: Any break in skin from BITE (e.g., cut, puncture, or scratch) and PET animal (e.g., dog, cat, or ferret) at risk for RABIES (e.g., sick, stray, unprovoked bite, developing country)    Negative: Any break in skin from BITE (e.g., cut, puncture, or scratch) and monkey    Answer Assessment - Initial Assessment Questions  1. ANIMAL: \"What type of animal caused the bite?\" \"Is the injury from a bite or a claw?\" If the animal is a dog or a cat, ask: \"Was it a pet or a stray?\" \"Was it acting ill or behaving strangely?\"      Pt was bit by her dog at approximately 10 am today.    2. LOCATION: \"Where is the bite located?\"       Left wrist    3. SIZE: \"How big is the bite?\" \"What does it look like?\"       Two puncture wounds from teeth.    4. ONSET: \"When did the bite happen?\" (Minutes or hours ago)       Approximately 10 am    5. CIRCUMSTANCES: \"Tell me how this happened.\"       Owner got between both her dogs that were fighting.    6. TETANUS: \"When was your last tetanus booster?\"      Pt believes updated in 2023.    7. RABIES VACCINE: For dog or cat bites, ask: \"Do you know if the pet is vaccinated against rabies?\"  (e.g., yes, no, overdue for rabies shot, unknown)      Dog up to date on rabies vaccines.    8. PREGNANCY: \"Is there any chance you are pregnant?\" \"When was your last menstrual period?\"      Not pregnant per pt.    Protocols used: Animal Bite-A-OH    "

## 2024-09-19 NOTE — TELEPHONE ENCOUNTER
spironolactone (ALDACTONE) 50 MG tablet     The original prescription was reordered on 9/19/2024 by Liliam Tsai PA-C    Addressed in a different encounter.

## 2024-09-19 NOTE — NURSING NOTE
Clinic Administered Medication Documentation        Patient was given Rocephin. Prior to medication administration, verified patient's identity using patient s name and date of birth. Please see MAR and medication order for additional information. Patient instructed to report any adverse reaction to staff immediately.    Vial/Syringe: Single dose vial. Was entire vial of medication used? Yes    Finn Olivo, CMA

## 2024-09-19 NOTE — PROGRESS NOTES
Chief Complaint:     Chief Complaint   Patient presents with    Urgent Care    Dog Bite     dog bite Left wrist like 10 o'clock today, it's her dog      ASSESSMENT     1. Dog bite, initial encounter       PLAN    Patient given 1 G Rocephin IM in clinic today.  Rx for Doxycycline  RICE discussed  Recommended rest and avoidance of activities which cause pain or swelling.  Pain relief: Acetaminophen and or Ibuprofen with food.  Patient verbalized understanding, and agrees with this plan.    HPI: Vibha Paulino is an 34 year old female who presents today for evaluation of dog bite.  Onset of the injury was 2 hours ago.    Patient reports she was walking her dog when it bit her wrist. It is her dog and she reports the dog is up to date on vaccines, including rabies. Patient irrigated the would with hydrogen peroxide.     Patient denies any numbness, tingling, or dysfunction of the left wrist, fingers or arm.    Patient states that she is up to date on Tetanus.    ROS:      Review of Systems   Constitutional:  Negative for chills and fever.   HENT:  Negative for congestion, ear pain, rhinorrhea and sore throat.    Eyes: Negative.    Respiratory: Negative.  Negative for cough and shortness of breath.    Cardiovascular: Negative.  Negative for chest pain and palpitations.   Gastrointestinal:  Negative for diarrhea, nausea and vomiting.   Endocrine: Negative.    Genitourinary: Negative.    Musculoskeletal: Negative.  Negative for arthralgias, back pain, joint swelling, myalgias, neck pain and neck stiffness.   Skin:  Positive for wound. Negative for rash.   Allergic/Immunologic: Negative.  Negative for immunocompromised state.   Neurological:  Negative for dizziness, weakness, light-headedness and headaches.        Problem history  Patient Active Problem List   Diagnosis    Episodic mood disorder (H24)    Episode of recurrent major depressive disorder, unspecified depression episode severity (H24)    RIK (generalized  Moderna dose 1, 2, and 3 anxiety disorder)    History of bulimia nervosa    Polysubstance overdose    Celiac disease        Allergies  No Known Allergies     Smoking History  History   Smoking Status    Former   Smokeless Tobacco    Never        Current Meds    Current Outpatient Medications:     atomoxetine (STRATTERA) 18 MG capsule, , Disp: , Rfl:     doxycycline hyclate (VIBRA-TABS) 100 MG tablet, Take 1 tablet (100 mg) by mouth 2 times daily for 7 days., Disp: 14 tablet, Rfl: 0    gabapentin (NEURONTIN) 300 MG capsule, Take 300 mg by mouth 3 times daily., Disp: , Rfl:     mirtazapine (REMERON) 30 MG tablet, Take 30 mg by mouth at bedtime., Disp: , Rfl:     norgestimate-ethinyl estradiol (ORTHO-CYCLEN) 0.25-35 MG-MCG tablet, , Disp: , Rfl:     buPROPion (WELLBUTRIN XL) 300 MG 24 hr tablet, TK 1 T PO D UTD (Patient not taking: Reported on 9/19/2024), Disp: , Rfl:     clindamycin (CLEOCIN T) 1 % external lotion, Apply topically 2 times daily (Patient not taking: Reported on 9/19/2024), Disp: 60 mL, Rfl: 1    cyclobenzaprine (FLEXERIL) 10 MG tablet, Take 0.5-1 tablets (5-10 mg) by mouth nightly as needed for muscle spasms (Patient not taking: Reported on 9/19/2024), Disp: 15 tablet, Rfl: 0    meloxicam (MOBIC) 15 MG tablet, Take 1 tablet (15 mg) by mouth daily (Patient not taking: Reported on 9/19/2024), Disp: 30 tablet, Rfl: 0    methylPREDNISolone (MEDROL) 4 MG tablet therapy pack, Follow Package Directions (Patient not taking: Reported on 9/19/2024), Disp: 21 tablet, Rfl: 0    spironolactone (ALDACTONE) 50 MG tablet, Take 1 tablet (50 mg) by mouth daily (Patient not taking: Reported on 9/19/2024), Disp: 90 tablet, Rfl: 1    tretinoin (RETIN-A) 0.025 % external cream, Use every night (Patient not taking: Reported on 9/19/2024), Disp: 45 g, Rfl: 1    Current Facility-Administered Medications:     cefTRIAXone (ROCEPHIN) in lidocaine 1% (PF) for IM administration 1 g, 1 g, Intramuscular, Once,     lidocaine 1 % injection 4 mL, 4 mL, , , , 4  mL at 04/18/24 1124    triamcinolone (KENALOG-40) injection 40 mg, 40 mg, , , , 40 mg at 04/18/24 1124        Vital signs reviewed by Tristan Abraham PA-C  BP (!) 128/92 (BP Location: Left arm, Patient Position: Sitting, Cuff Size: Adult Regular)   Pulse 104   Temp 98.3  F (36.8  C) (Oral)   Resp 16   Wt 62.4 kg (137 lb 8 oz)   SpO2 99%   BMI 21.38 kg/m      Physical Exam     Physical Exam  Vitals and nursing note reviewed.   Constitutional:       General: She is not in acute distress.     Appearance: She is well-developed. She is not ill-appearing, toxic-appearing or diaphoretic.   HENT:      Head: Normocephalic and atraumatic.      Right Ear: Tympanic membrane and external ear normal. No drainage, swelling or tenderness. Tympanic membrane is not perforated, erythematous, retracted or bulging.      Left Ear: Tympanic membrane and external ear normal. No drainage, swelling or tenderness. Tympanic membrane is not perforated, erythematous, retracted or bulging.      Nose: No mucosal edema, congestion or rhinorrhea.      Right Sinus: No maxillary sinus tenderness or frontal sinus tenderness.      Left Sinus: No maxillary sinus tenderness or frontal sinus tenderness.      Mouth/Throat:      Pharynx: No pharyngeal swelling, oropharyngeal exudate, posterior oropharyngeal erythema or uvula swelling.      Tonsils: No tonsillar abscesses.   Eyes:      Pupils: Pupils are equal, round, and reactive to light.   Neck:      Trachea: Trachea normal.   Cardiovascular:      Rate and Rhythm: Normal rate and regular rhythm.      Heart sounds: Normal heart sounds, S1 normal and S2 normal. No murmur heard.     No friction rub. No gallop.   Pulmonary:      Effort: Pulmonary effort is normal. No respiratory distress.      Breath sounds: Normal breath sounds. No decreased breath sounds, wheezing, rhonchi or rales.   Abdominal:      General: Bowel sounds are normal. There is no distension.      Palpations: Abdomen is soft. Abdomen is  not rigid. There is no mass.      Tenderness: There is no abdominal tenderness. There is no guarding or rebound.   Musculoskeletal:      Cervical back: Full passive range of motion without pain, normal range of motion and neck supple.      Comments: 0.5cm puncture would to the ulnar aspect of the left wrist. Bleeding is controlled. No signs of infection.    Lymphadenopathy:      Cervical: No cervical adenopathy.   Skin:     General: Skin is warm and dry.   Neurological:      Mental Status: She is alert and oriented to person, place, and time.      Cranial Nerves: No cranial nerve deficit.      Deep Tendon Reflexes: Reflexes are normal and symmetric.   Psychiatric:         Behavior: Behavior normal. Behavior is cooperative.         Thought Content: Thought content normal.         Judgment: Judgment normal.             Tristan Abraham PA-C  9/19/2024, 11:47 AM

## 2024-09-19 NOTE — TELEPHONE ENCOUNTER
Medication Requested:  spironolactone (ALDACTONE) 50 MG tablet 90 tablet 1 3/20/2024 -- No   Sig - Route: Take 1 tablet (50 mg) by mouth daily - Oral     ----------------------  Last Office Visit : 3/20/2024  [facility]      Future Office visit:  0  ----------------------        Refill decision: Refill pended and routed to the provider for review/determination due to the following criteria not met:     - 6 mo since LOV, no future appt scheduled  - Pended for review  - Scheduling to contact patient for appt (Per LOV: Follow-up: 6 month(s) in-person)      Pass/Fail Protocol Criteria:  spironolactone (ALDACTONE)   Derm-Allergy Refill Process #2     []Do NOT review last clinic note.   []Refill qty to 6 months from last clinic visit   []Refill with or without scheduled appointment.   [x]Refuse if over 6 months.     [x]NO need to contact Clinic Coordinators about appointment unless over 6 mo.

## 2024-09-19 NOTE — TELEPHONE ENCOUNTER
2nd refill request for Spironolactone 50mg tablets from [facility] on [address] in [place], [place].  Rx: [id number]  Tel: [phone]  Fax: [phone]    Deni Linda on 9/19/2024 at 10:39 AM

## 2024-09-19 NOTE — TELEPHONE ENCOUNTER
LVD:  3/20/2024  [facility]     Liliam Tsai PA-C  Dermatology     Refilled per protocol #1.

## 2024-09-22 ENCOUNTER — HEALTH MAINTENANCE LETTER (OUTPATIENT)
Age: 34
End: 2024-09-22

## 2024-10-19 DIAGNOSIS — L70.0 ACNE VULGARIS: ICD-10-CM

## 2024-10-25 NOTE — TELEPHONE ENCOUNTER
spironolactone (ALDACTONE) 50 MG tablet 30 tablet 2 9/19/2024     Last Office Visit: 3/20/24  Future Office visit:   3/5/25      Routing refill request to provider for review/approval because:  Failed refill process # 2    Mary Rob RN  P Central Nursing/Red Flag Triage & Med Refill Team

## 2024-10-28 RX ORDER — SPIRONOLACTONE 50 MG/1
50 TABLET, FILM COATED ORAL DAILY
Qty: 90 TABLET | Refills: 1 | Status: SHIPPED | OUTPATIENT
Start: 2024-10-28

## 2025-03-04 DIAGNOSIS — L70.0 ACNE VULGARIS: ICD-10-CM

## 2025-03-10 RX ORDER — SPIRONOLACTONE 50 MG/1
50 TABLET, FILM COATED ORAL DAILY
Qty: 30 TABLET | OUTPATIENT
Start: 2025-03-10

## 2025-07-15 ENCOUNTER — OFFICE VISIT (OUTPATIENT)
Dept: PHYSICAL MEDICINE AND REHAB | Facility: CLINIC | Age: 35
End: 2025-07-15
Payer: COMMERCIAL

## 2025-07-15 ENCOUNTER — HOSPITAL ENCOUNTER (OUTPATIENT)
Dept: RADIOLOGY | Facility: CLINIC | Age: 35
Discharge: HOME | End: 2025-07-15
Attending: NURSE PRACTITIONER
Payer: COMMERCIAL

## 2025-07-15 VITALS
SYSTOLIC BLOOD PRESSURE: 148 MMHG | BODY MASS INDEX: 22.6 KG/M2 | WEIGHT: 144 LBS | HEIGHT: 67 IN | DIASTOLIC BLOOD PRESSURE: 104 MMHG | HEART RATE: 114 BPM

## 2025-07-15 DIAGNOSIS — M54.41 CHRONIC BILATERAL LOW BACK PAIN WITH RIGHT-SIDED SCIATICA: ICD-10-CM

## 2025-07-15 DIAGNOSIS — G89.29 CHRONIC NECK PAIN: Primary | ICD-10-CM

## 2025-07-15 DIAGNOSIS — M54.50 CHRONIC BILATERAL LOW BACK PAIN WITHOUT SCIATICA: ICD-10-CM

## 2025-07-15 DIAGNOSIS — M54.2 CHRONIC NECK PAIN: ICD-10-CM

## 2025-07-15 DIAGNOSIS — G89.29 CHRONIC MIDLINE THORACIC BACK PAIN: ICD-10-CM

## 2025-07-15 DIAGNOSIS — M54.6 CHRONIC MIDLINE THORACIC BACK PAIN: ICD-10-CM

## 2025-07-15 DIAGNOSIS — G89.29 CHRONIC BILATERAL LOW BACK PAIN WITH RIGHT-SIDED SCIATICA: ICD-10-CM

## 2025-07-15 DIAGNOSIS — R20.2 NUMBNESS AND TINGLING OF RIGHT LOWER EXTREMITY: ICD-10-CM

## 2025-07-15 DIAGNOSIS — M54.2 CHRONIC NECK PAIN: Primary | ICD-10-CM

## 2025-07-15 DIAGNOSIS — G89.29 CHRONIC BILATERAL LOW BACK PAIN WITHOUT SCIATICA: ICD-10-CM

## 2025-07-15 DIAGNOSIS — M54.16 RIGHT LUMBAR RADICULOPATHY: ICD-10-CM

## 2025-07-15 DIAGNOSIS — G89.29 CHRONIC NECK PAIN: ICD-10-CM

## 2025-07-15 DIAGNOSIS — R20.0 NUMBNESS AND TINGLING OF RIGHT LOWER EXTREMITY: ICD-10-CM

## 2025-07-15 PROCEDURE — 72040 X-RAY EXAM NECK SPINE 2-3 VW: CPT

## 2025-07-15 PROCEDURE — 72070 X-RAY EXAM THORAC SPINE 2VWS: CPT

## 2025-07-15 ASSESSMENT — PAIN SCALES - GENERAL: PAINLEVEL_OUTOF10: SEVERE PAIN (8)

## 2025-07-15 NOTE — PATIENT INSTRUCTIONS
~Spine Center Scheduling #[phone].  ~Please call our [facility] Spine Nurse Navigation #[phone] with any questions or concerns about your treatment plan, if symptoms worsen and you would like to be seen urgently, or if you have problems controlling bladder and bowel function.  ~For any future flareups or new symptoms, recommend follow-up in clinic or contact the nurse navigator line.  ~Please note that any My Chart messages may take multiple days for a response due to the high volume of patients seen in clinic.  Anything sent Thursday night or after will be answered the following week when able, as Whitney Yepez CNP does not work in clinic on Fridays.   ~Whitney Yepez CNP is at the [facility] on Tuesdays, otherwise primarily at the [facility].       ~You have been referred for Physical Therapy to [facility]. They will call you to schedule an appointment.       Scheduling phone number is [phone] for [facility] [place], [place], or [place] location.  If you have not heard from the scheduling office within 2 business days, please call [phone] for ALL other locations.     Discussed the importance of core strengthening, ROM, stretching exercises and how each of these entities is important in decreasing pain and improving long term spine health.  The purpose of physical therapy is to teach you an individualized home exercise program.  These exercises need to be performed every day in order to decrease pain and prevent future occurrences of pain.            Imaging has been ordered. Radiology will call you to schedule. Please call below if you do not hear from them in the next couple of days.   [facility] Scheduling  Please call [phone] to schedule your image(s) (select option #1). There are 3 different locations near, see below.   There are imaging options for other locations as well, the imaging schedulers  can help with other locations within [place].     [facility]  [address] [address]    [facility]  [address]   [place] [phone]    [place] [phone]

## 2025-07-15 NOTE — LETTER
7/15/2025      Vibha Paulino  [address]  [place] [address]      Dear Colleague,    Thank you for referring your patient, Vibha Paulino, to the [facility]. Please see a copy of my visit note below.    ASSESSMENT: Vibha Paulino is a 35 year old female who presents for consultation at the request of PCP Clinic - [facility], with a past medical history significant for Recurrent major depressive disorder, generalized anxiety disorder, bulimia nervosa, celiac disease, polysubstance overdose who presents today for new patient evaluation of:    -Chronic intermittent neck pain    -Chronic intermittent midline thoracic pain    -Progressive right low back pain with right lumbar radiculopathy with paresthesias right lower extremity.  ER visit 6/21/2025.    Patient is neurologically intact on exam. No myelopathic or red flag symptoms.          No data to display                     Diagnoses and all orders for this visit:  Chronic neck pain  -     XR Cervical Spine 2/3 Views; Future  -     Physical Therapy  Referral; Future  Right lumbar radiculopathy  -     MR Lumbar Spine w/o Contrast; Future  -     Physical Therapy  Referral; Future  Chronic bilateral low back pain without sciatica  Numbness and tingling of right lower extremity  -     MR Lumbar Spine w/o Contrast; Future  -     Physical Therapy  Referral; Future  Chronic midline thoracic back pain  -     XR Thoracic Spine 2 Views; Future  -     Physical Therapy  Referral; Future  Chronic bilateral low back pain with right-sided sciatica  -     MR Lumbar Spine w/o Contrast; Future  -     Physical Therapy  Referral; Future    PLAN:  Reviewed spine anatomy and disease process. Discussed diagnosis and treatment options with the patient today. A shared decision making model was used.  The patient's values and choices were respected. The following represents  what was discussed and decided upon by the provider and the patient.      -DIAGNOSTIC TESTS:  Images were personally reviewed and interpreted and explained to patient today using spine model.   -- Order lumbar spine MRI to further evaluate lumbar radiculopathy.  Ordered cervical spine and thoracic spine x-ray to further evaluate pain.  -- Lumbar spine x-ray 4/18/2024 with normal disc height and alignment.  Mild facet arthropathy lower lumbar spine.  Left ilium sclerotic lesion consistent with bony island per radiology.    -PHYSICAL THERAPY: Referral to physical therapy, patient lives in [place] therefore would like to go closer to home for establishing home exercises for spinal strengthening and nerve glide exercises for lumbar radiculopathy.  Discussed the importance of core strengthening, ROM, stretching exercises with the patient and how each of these entities is important in decreasing pain.  Explained to the patient that the purpose of physical therapy is to teach the patient a home exercise program.  These exercises need to be performed every day in order to decrease pain and prevent future occurrences of pain.        -MEDICATIONS: No changes in medications today.    -INTERVENTIONS: We did discuss potential injections however would recommend trialing physical therapy if symptoms are tolerable before considering injection.  Discussed risks and benefits of injections with patient today.    -PATIENT EDUCATION:  Total time of 46 minutes, on the day of service, spent with the patient, reviewing the chart, placing orders, and documenting.     -FOLLOW-UP:   Follow-up Postcard on the [employer] message for imaging results and plan.    Advised patient to call the [facility] if symptoms worsen or you have problems controlling bladder and bowel function.   ______________________________________________________________________    SUBJECTIVE:  HPI:  Vibha OSMAN Stevepietro  Is a 35 year old female who presents today for new patient evaluation of  low back pain lower lumbar spine with intermittent numbness and tingling in the right lower extremity this been ongoing for the last couple of years however became worse after chiropractic manipulation in June and did go to the ED on 6/20/2025 due to the severity of the pain.  Currently her pain is an 8/10, a 10 at its worst, a 2 at its best.    She does report intermittent neck pain as well as midline thoracic pain as well but her low back pain is her biggest concern today.    Patient's male  was present today during entire visit and added to past medical/surgical/family/social history and history of presenting illness.     -Treatment to Date: No prior spinal surgery or spinal injection.    -Medications:  Gabapentin 300 mg  Cyclobenzaprine    Current Outpatient Medications   Medication Sig Dispense Refill     gabapentin (NEURONTIN) 300 MG capsule Take 300 mg by mouth 3 times daily.       atomoxetine (STRATTERA) 18 MG capsule        buPROPion (WELLBUTRIN XL) 300 MG 24 hr tablet TK 1 T PO D UTD (Patient not taking: Reported on 9/19/2024)       clindamycin (CLEOCIN T) 1 % external lotion Apply topically 2 times daily (Patient not taking: Reported on 9/19/2024) 60 mL 1     cyclobenzaprine (FLEXERIL) 10 MG tablet Take 0.5-1 tablets (5-10 mg) by mouth nightly as needed for muscle spasms (Patient not taking: Reported on 7/15/2025) 15 tablet 0     meloxicam (MOBIC) 15 MG tablet Take 1 tablet (15 mg) by mouth daily (Patient not taking: Reported on 7/15/2025) 30 tablet 0     mirtazapine (REMERON) 30 MG tablet Take 30 mg by mouth at bedtime.       norgestimate-ethinyl estradiol (ORTHO-CYCLEN) 0.25-35 MG-MCG tablet        spironolactone (ALDACTONE) 50 MG tablet TAKE 1 TABLET(50 MG) BY MOUTH DAILY 90 tablet 1     tretinoin (RETIN-A) 0.025 % external cream APPLY TOPICALLY TO THE AFFECTED AREA EVERY NIGHT 180 g 1     No current facility-administered medications for this visit.       Allergies   Allergen Reactions      "Gluten Meal GI Disturbance, Diarrhea, Nausea and Nausea and Vomiting     Pt states she has a diagnosis of celiac disease, avoid all gluten containing foods       Past Medical History:   Diagnosis Date     Acne vulgaris         Patient Active Problem List   Diagnosis     Episodic mood disorder     Episode of recurrent major depressive disorder, unspecified depression episode severity     RIK (generalized anxiety disorder)     History of bulimia nervosa     Polysubstance overdose     Celiac disease       Past Surgical History:   Procedure Laterality Date     NO HISTORY OF SURGERY         Family History   Problem Relation Age of Onset     No Known Problems Mother      No Known Problems Father      No Known Problems Brother      Skin Cancer Brother      Alzheimer Disease Paternal Grandmother      No Known Problems Son        Reviewed past medical, surgical, and family history with patient found on new patient intake packet located in EMR Media tab.     SOCIAL HX: Patient denies smoking/tobacco use.  Denies alcohol use, denies history being heavy drinker, denies recreational drug use.    ROS: Specifically negative for bowel/bladder dysfunction, balance changes, headache, dizziness, foot drop, fevers, chills, appetite changes, nausea/vomiting, unexplained weight loss. Otherwise 13 systems reviewed are negative. Please see the patient's intake questionnaire from today for details.    OBJECTIVE:  BP (!) 148/104   Pulse 114   Ht 5' 7.25\" (1.708 m)   Wt 144 lb (65.3 kg)   BMI 22.39 kg/m      PHYSICAL EXAMINATION:    --CONSTITUTIONAL:  Vital signs as above.  No acute distress.  The patient is well nourished and well groomed.  --PSYCHIATRIC:  Appropriate mood and affect. The patient is awake, alert, oriented to person, place, time and answering questions appropriately with clear speech.    --SKIN:  Skin over the face, bilateral lower extremities, and posterior torso is clean, dry, intact without rashes.    --RESPIRATORY: " Normal rhythm and effort. No abnormal accessory muscle breathing patterns noted.   --STANDING EXAMINATION:  Normal lumbar lordosis noted, no lateral shift.  --MUSCULOSKELETAL: Range of motion is not limited in lumbar flexion, extension, lateral rotation. No tenderness to palpation lumbar spine. Straight leg raising is negative to radicular pain on the left and positive on the right. Sciatic notch non-tender.  --GROSS MOTOR: Gait is non-antalgic. Easily arises from a seated position.   --LOWER EXTREMITY MOTOR TESTING:  Plantar flexion left 5/5, right 5/5   Dorsiflexion left 5/5, right 5/5   Great toe MTP extension left 5/5, right 5/5  Knee flexion left 5/5, right 5/5  Knee extension left 5/5, right 5/5   Hip flexion left 5/5, right 5/5  Hip abduction left 5/5, right 5/5  Hip adduction left 5/5, right 5/5   --HIPS: Full range of motion bilaterally.   --NEUROLOGICAL:  2/4 patellar, medial hamstring, and achilles reflexes bilaterally.  Sensation to light touch is intact in the bilateral L4, L5, and S1 dermatomes. Babinski is negative. No clonus.  Negative Ronnie reflex bilaterally.  --VASCULAR:  Bilateral lower extremities are warm.      CERVICAL:   --HEENT:  Sclera are non-injected.  Extraocular muscles are intact.  Thyroid moves easily upon swallowing.  Moist oral mucosa.  --SKIN:  Skin over the face, bilateral upper extremities, and posterior torso is clean, dry, intact without rashes.  --UPPER EXTREMITY MOTOR TESTING:  Wrist flexion left 5/5, right 5/5  Wrist extension left 5/5, right 5/5  Pronators left 5/5, right 5/5  Biceps left 5/5, right 5/5   Triceps left 5/5, right 5/5   Shoulder abduction left 5/5, right 5/5   left 5/5, right 5/5  --NEUROLOGIC:  CN III-XII are grossly intact.  Bilateral patellar and achilles reflexes are physiologic and symmetric. 2/4 symmetric biceps, brachioradialis, triceps reflexes bilaterally.  Sensation to upper extremities is intact.  Negative Morfin's bilaterally.     --VASCULAR:  2/4 radial pulses bilaterally.  Warm upper limbs bilaterally.  Capillary refill in the upper extremities is less than 1 second. No obvious lower extremity swelling or varicosities.   --CERVICAL SPINE: Inspection reveals no evidence of deformity. Range of motion is not limited in cervical flexion, extension, or lateral rotation. No tenderness to palpation. Spurlings maneuver is negative to radicular pain.      RESULTS: Prior medical records from [facility] and Care Everywhere were reviewed today.    Imaging: Spine imaging was personally reviewed and interpreted today. The images were shown to the patient and the findings were explained using a spine model.      Lumbar spine x-ray reviewed           Again, thank you for allowing me to participate in the care of your patient.        Sincerely,        Whitney Yepez CNP    Electronically signed

## 2025-07-15 NOTE — PROGRESS NOTES
ASSESSMENT: Vibha Paulino is a 35 year old female who presents for consultation at the request of PCP Clinic - [facility], with a past medical history significant for Recurrent major depressive disorder, generalized anxiety disorder, bulimia nervosa, celiac disease, polysubstance overdose who presents today for new patient evaluation of:    -Chronic intermittent neck pain    -Chronic intermittent midline thoracic pain    -Progressive right low back pain with right lumbar radiculopathy with paresthesias right lower extremity.  ER visit 6/21/2025.    Patient is neurologically intact on exam. No myelopathic or red flag symptoms.          No data to display                     Diagnoses and all orders for this visit:  Chronic neck pain  -     XR Cervical Spine 2/3 Views; Future  -     Physical Therapy  Referral; Future  Right lumbar radiculopathy  -     MR Lumbar Spine w/o Contrast; Future  -     Physical Therapy  Referral; Future  Chronic bilateral low back pain without sciatica  Numbness and tingling of right lower extremity  -     MR Lumbar Spine w/o Contrast; Future  -     Physical Therapy  Referral; Future  Chronic midline thoracic back pain  -     XR Thoracic Spine 2 Views; Future  -     Physical Therapy  Referral; Future  Chronic bilateral low back pain with right-sided sciatica  -     MR Lumbar Spine w/o Contrast; Future  -     Physical Therapy  Referral; Future    PLAN:  Reviewed spine anatomy and disease process. Discussed diagnosis and treatment options with the patient today. A shared decision making model was used.  The patient's values and choices were respected. The following represents what was discussed and decided upon by the provider and the patient.      -DIAGNOSTIC TESTS:  Images were personally reviewed and interpreted and explained to patient today using spine model.   -- Order lumbar spine MRI to further evaluate lumbar  radiculopathy.  Ordered cervical spine and thoracic spine x-ray to further evaluate pain.  -- Lumbar spine x-ray 4/18/2024 with normal disc height and alignment.  Mild facet arthropathy lower lumbar spine.  Left ilium sclerotic lesion consistent with bony island per radiology.    -PHYSICAL THERAPY: Referral to physical therapy, patient lives in [place] therefore would like to go closer to home for establishing home exercises for spinal strengthening and nerve glide exercises for lumbar radiculopathy.  Discussed the importance of core strengthening, ROM, stretching exercises with the patient and how each of these entities is important in decreasing pain.  Explained to the patient that the purpose of physical therapy is to teach the patient a home exercise program.  These exercises need to be performed every day in order to decrease pain and prevent future occurrences of pain.        -MEDICATIONS: No changes in medications today.    -INTERVENTIONS: We did discuss potential injections however would recommend trialing physical therapy if symptoms are tolerable before considering injection.  Discussed risks and benefits of injections with patient today.    -PATIENT EDUCATION:  Total time of 46 minutes, on the day of service, spent with the patient, reviewing the chart, placing orders, and documenting.     -FOLLOW-UP:   Follow-up [employer] message for imaging results and plan.    Advised patient to call the [facility] if symptoms worsen or you have problems controlling bladder and bowel function.   ______________________________________________________________________    SUBJECTIVE:  HPI:  Vibha Paulino  Is a 35 year old female who presents today for new patient evaluation of low back pain lower lumbar spine with intermittent numbness and tingling in the right lower extremity this been ongoing for the last couple of years however became worse after chiropractic manipulation in June and did go to the ED on 6/20/2025  due to the severity of the pain.  Currently her pain is an 8/10, a 10 at its worst, a 2 at its best.    She does report intermittent neck pain as well as midline thoracic pain as well but her low back pain is her biggest concern today.    Patient's male  was present today during entire visit and added to past medical/surgical/family/social history and history of presenting illness.     -Treatment to Date: No prior spinal surgery or spinal injection.    -Medications:  Gabapentin 300 mg  Cyclobenzaprine    Current Outpatient Medications   Medication Sig Dispense Refill    gabapentin (NEURONTIN) 300 MG capsule Take 300 mg by mouth 3 times daily.      atomoxetine (STRATTERA) 18 MG capsule       buPROPion (WELLBUTRIN XL) 300 MG 24 hr tablet TK 1 T PO D UTD (Patient not taking: Reported on 9/19/2024)      clindamycin (CLEOCIN T) 1 % external lotion Apply topically 2 times daily (Patient not taking: Reported on 9/19/2024) 60 mL 1    cyclobenzaprine (FLEXERIL) 10 MG tablet Take 0.5-1 tablets (5-10 mg) by mouth nightly as needed for muscle spasms (Patient not taking: Reported on 7/15/2025) 15 tablet 0    meloxicam (MOBIC) 15 MG tablet Take 1 tablet (15 mg) by mouth daily (Patient not taking: Reported on 7/15/2025) 30 tablet 0    mirtazapine (REMERON) 30 MG tablet Take 30 mg by mouth at bedtime.      norgestimate-ethinyl estradiol (ORTHO-CYCLEN) 0.25-35 MG-MCG tablet       spironolactone (ALDACTONE) 50 MG tablet TAKE 1 TABLET(50 MG) BY MOUTH DAILY 90 tablet 1    tretinoin (RETIN-A) 0.025 % external cream APPLY TOPICALLY TO THE AFFECTED AREA EVERY NIGHT 180 g 1     No current facility-administered medications for this visit.       Allergies   Allergen Reactions    Gluten Meal GI Disturbance, Diarrhea, Nausea and Nausea and Vomiting     Pt states she has a diagnosis of celiac disease, avoid all gluten containing foods       Past Medical History:   Diagnosis Date    Acne vulgaris         Patient Active Problem List  "  Diagnosis    Episodic mood disorder    Episode of recurrent major depressive disorder, unspecified depression episode severity    RIK (generalized anxiety disorder)    History of bulimia nervosa    Polysubstance overdose    Celiac disease       Past Surgical History:   Procedure Laterality Date    NO HISTORY OF SURGERY         Family History   Problem Relation Age of Onset    No Known Problems Mother     No Known Problems Father     No Known Problems Brother     Skin Cancer Brother     Alzheimer Disease Paternal Grandmother     No Known Problems Son        Reviewed past medical, surgical, and family history with patient found on new patient intake packet located in EMR Media tab.     SOCIAL HX: Patient denies smoking/tobacco use.  Denies alcohol use, denies history being heavy drinker, denies recreational drug use.    ROS: Specifically negative for bowel/bladder dysfunction, balance changes, headache, dizziness, foot drop, fevers, chills, appetite changes, nausea/vomiting, unexplained weight loss. Otherwise 13 systems reviewed are negative. Please see the patient's intake questionnaire from today for details.    OBJECTIVE:  BP (!) 148/104   Pulse 114   Ht 5' 7.25\" (1.708 m)   Wt 144 lb (65.3 kg)   BMI 22.39 kg/m      PHYSICAL EXAMINATION:    --CONSTITUTIONAL:  Vital signs as above.  No acute distress.  The patient is well nourished and well groomed.  --PSYCHIATRIC:  Appropriate mood and affect. The patient is awake, alert, oriented to person, place, time and answering questions appropriately with clear speech.    --SKIN:  Skin over the face, bilateral lower extremities, and posterior torso is clean, dry, intact without rashes.    --RESPIRATORY: Normal rhythm and effort. No abnormal accessory muscle breathing patterns noted.   --STANDING EXAMINATION:  Normal lumbar lordosis noted, no lateral shift.  --MUSCULOSKELETAL: Range of motion is not limited in lumbar flexion, extension, lateral rotation. No tenderness " to palpation lumbar spine. Straight leg raising is negative to radicular pain on the left and positive on the right. Sciatic notch non-tender.  --GROSS MOTOR: Gait is non-antalgic. Easily arises from a seated position.   --LOWER EXTREMITY MOTOR TESTING:  Plantar flexion left 5/5, right 5/5   Dorsiflexion left 5/5, right 5/5   Great toe MTP extension left 5/5, right 5/5  Knee flexion left 5/5, right 5/5  Knee extension left 5/5, right 5/5   Hip flexion left 5/5, right 5/5  Hip abduction left 5/5, right 5/5  Hip adduction left 5/5, right 5/5   --HIPS: Full range of motion bilaterally.   --NEUROLOGICAL:  2/4 patellar, medial hamstring, and achilles reflexes bilaterally.  Sensation to light touch is intact in the bilateral L4, L5, and S1 dermatomes. Babinski is negative. No clonus.  Negative Ronnie reflex bilaterally.  --VASCULAR:  Bilateral lower extremities are warm.      CERVICAL:   --HEENT:  Sclera are non-injected.  Extraocular muscles are intact.  Thyroid moves easily upon swallowing.  Moist oral mucosa.  --SKIN:  Skin over the face, bilateral upper extremities, and posterior torso is clean, dry, intact without rashes.  --UPPER EXTREMITY MOTOR TESTING:  Wrist flexion left 5/5, right 5/5  Wrist extension left 5/5, right 5/5  Pronators left 5/5, right 5/5  Biceps left 5/5, right 5/5   Triceps left 5/5, right 5/5   Shoulder abduction left 5/5, right 5/5   left 5/5, right 5/5  --NEUROLOGIC:  CN III-XII are grossly intact.  Bilateral patellar and achilles reflexes are physiologic and symmetric. 2/4 symmetric biceps, brachioradialis, triceps reflexes bilaterally.  Sensation to upper extremities is intact.  Negative Morfin's bilaterally.    --VASCULAR:  2/4 radial pulses bilaterally.  Warm upper limbs bilaterally.  Capillary refill in the upper extremities is less than 1 second. No obvious lower extremity swelling or varicosities.   --CERVICAL SPINE: Inspection reveals no evidence of deformity. Range of motion is  not limited in cervical flexion, extension, or lateral rotation. No tenderness to palpation. Spurlings maneuver is negative to radicular pain.      RESULTS: Prior medical records from [facility] and Care Everywhere were reviewed today.    Imaging: Spine imaging was personally reviewed and interpreted today. The images were shown to the patient and the findings were explained using a spine model.      Lumbar spine x-ray reviewed

## 2025-08-29 DIAGNOSIS — L70.0 ACNE VULGARIS: ICD-10-CM

## 2025-09-04 RX ORDER — SPIRONOLACTONE 50 MG/1
50 TABLET, FILM COATED ORAL DAILY
Qty: 90 TABLET | Refills: 1 | Status: SHIPPED | OUTPATIENT
Start: 2025-09-04